# Patient Record
Sex: FEMALE | Race: WHITE | NOT HISPANIC OR LATINO | Employment: OTHER | URBAN - METROPOLITAN AREA
[De-identification: names, ages, dates, MRNs, and addresses within clinical notes are randomized per-mention and may not be internally consistent; named-entity substitution may affect disease eponyms.]

---

## 2017-04-12 ENCOUNTER — HOSPITAL ENCOUNTER (OUTPATIENT)
Dept: CARDIOLOGY | Facility: HOSPITAL | Age: 62
Discharge: HOME | End: 2017-04-12
Attending: HOSPITALIST | Admitting: HOSPITALIST

## 2017-05-04 ENCOUNTER — HOSPITAL ENCOUNTER (OUTPATIENT)
Dept: PREOP | Facility: HOSPITAL | Age: 62
Setting detail: HOSPITAL OUTPATIENT SURGERY
Discharge: HOME | End: 2017-05-04
Attending: INTERNAL MEDICINE | Admitting: INTERNAL MEDICINE

## 2017-05-04 ENCOUNTER — ON CAMPUS - OUTPATIENT (AMBULATORY)
Dept: URBAN - METROPOLITAN AREA HOSPITAL 85 | Facility: HOSPITAL | Age: 62
End: 2017-05-04

## 2017-05-04 DIAGNOSIS — K57.30 DIVERTICULOSIS OF LARGE INTESTINE WITHOUT PERFORATION OR ABS: ICD-10-CM

## 2017-05-04 DIAGNOSIS — Z12.11 ENCOUNTER FOR SCREENING FOR MALIGNANT NEOPLASM OF COLON: ICD-10-CM

## 2017-05-04 DIAGNOSIS — D12.2 BENIGN NEOPLASM OF ASCENDING COLON: ICD-10-CM

## 2017-05-04 DIAGNOSIS — K64.0 FIRST DEGREE HEMORRHOIDS: ICD-10-CM

## 2017-05-04 DIAGNOSIS — K21.9 GASTRO-ESOPHAGEAL REFLUX DISEASE WITHOUT ESOPHAGITIS: ICD-10-CM

## 2017-05-04 DIAGNOSIS — R13.10 DYSPHAGIA, UNSPECIFIED: ICD-10-CM

## 2017-05-04 PROCEDURE — 43450 DILATE ESOPHAGUS 1/MULT PASS: CPT | Performed by: INTERNAL MEDICINE

## 2017-05-04 PROCEDURE — 43235 EGD DIAGNOSTIC BRUSH WASH: CPT | Performed by: INTERNAL MEDICINE

## 2017-05-04 PROCEDURE — 45380 COLONOSCOPY AND BIOPSY: CPT | Mod: 59 | Performed by: INTERNAL MEDICINE

## 2017-05-04 PROCEDURE — 45385 COLONOSCOPY W/LESION REMOVAL: CPT | Performed by: INTERNAL MEDICINE

## 2017-07-27 ENCOUNTER — HOSPITAL ENCOUNTER (OUTPATIENT)
Dept: MAMMOGRAPHY | Facility: HOSPITAL | Age: 62
Discharge: HOME | End: 2017-07-27
Attending: OBSTETRICS & GYNECOLOGY | Admitting: OBSTETRICS & GYNECOLOGY

## 2017-11-29 ENCOUNTER — HOSPITAL ENCOUNTER (OUTPATIENT)
Dept: GENERAL RADIOLOGY | Facility: HOSPITAL | Age: 62
Discharge: HOME | End: 2017-11-29
Attending: SURGERY | Admitting: SURGERY

## 2017-12-13 ENCOUNTER — HOSPITAL ENCOUNTER (OUTPATIENT)
Dept: FAMILY MEDICINE CLINIC | Facility: CLINIC | Age: 62
Setting detail: SPECIMEN
Discharge: HOME | End: 2017-12-13
Attending: NURSE PRACTITIONER | Admitting: NURSE PRACTITIONER

## 2017-12-13 LAB
AMPICILLIN SUSC ISLT: NORMAL
AZTREONAM SUSC ISLT: NORMAL
BACTERIA ISLT: NORMAL
BACTERIA SPEC AEROBE CULT: NORMAL
CEFAZOLIN SUSC ISLT: NORMAL
CEFEPIME SUSC ISLT: NORMAL
CEFTRIAXONE SUSC ISLT: NORMAL
CIPROFLOXACIN SUSC ISLT: NORMAL
COLONY COUNT: NORMAL
ERTAPENEM SUSC ISLT: NORMAL
LEVOFLOXACIN SUSC ISLT: NORMAL
Lab: NORMAL
MEROPENEM SUSC ISLT: NORMAL
MICRO REPORT STATUS: NORMAL
NITROFURANTOIN SUSC ISLT: NORMAL
PIP+TAZO SUSC ISLT: NORMAL
SPECIMEN SOURCE: NORMAL
SUSC METH SPEC: NORMAL
TETRACYCLINE SUSC ISLT: NORMAL
TOBRAMYCIN SUSC ISLT: NORMAL
TRIMETHOPRIM/SULFA: NORMAL

## 2017-12-22 ENCOUNTER — HOSPITAL ENCOUNTER (OUTPATIENT)
Dept: FAMILY MEDICINE CLINIC | Facility: CLINIC | Age: 62
Setting detail: SPECIMEN
Discharge: HOME | End: 2017-12-22
Attending: HOSPITALIST | Admitting: HOSPITALIST

## 2017-12-22 LAB
ALBUMIN SERPL-MCNC: 3.9 G/DL (ref 3.5–4.8)
ALBUMIN/GLOB SERPL: 1.3 {RATIO} (ref 1–1.7)
ALP SERPL-CCNC: 85 IU/L (ref 32–91)
ALT SERPL-CCNC: 20 IU/L (ref 14–54)
ANION GAP SERPL CALC-SCNC: 11.4 MMOL/L (ref 10–20)
AST SERPL-CCNC: 27 IU/L (ref 15–41)
BASOPHILS # BLD AUTO: 0.1 10*3/UL (ref 0–0.2)
BASOPHILS NFR BLD AUTO: 1 % (ref 0–2)
BILIRUB SERPL-MCNC: 0.3 MG/DL (ref 0.3–1.2)
BUN SERPL-MCNC: 15 MG/DL (ref 8–20)
BUN/CREAT SERPL: 21.4 (ref 5.4–26.2)
CALCIUM SERPL-MCNC: 9.5 MG/DL (ref 8.9–10.3)
CHLORIDE SERPL-SCNC: 101 MMOL/L (ref 101–111)
CONV CO2: 29 MMOL/L (ref 22–32)
CONV TOTAL PROTEIN: 7 G/DL (ref 6.1–7.9)
CREAT UR-MCNC: 0.7 MG/DL (ref 0.4–1)
DIFFERENTIAL METHOD BLD: (no result)
EOSINOPHIL # BLD AUTO: 0.1 10*3/UL (ref 0–0.3)
EOSINOPHIL # BLD AUTO: 1 % (ref 0–3)
ERYTHROCYTE [DISTWIDTH] IN BLOOD BY AUTOMATED COUNT: 13.1 % (ref 11.5–14.5)
GLOBULIN UR ELPH-MCNC: 3.1 G/DL (ref 2.5–3.8)
GLUCOSE SERPL-MCNC: 99 MG/DL (ref 65–99)
HCT VFR BLD AUTO: 40.5 % (ref 35–49)
HGB BLD-MCNC: 13.8 G/DL (ref 12–15)
LYMPHOCYTES # BLD AUTO: 2.8 10*3/UL (ref 0.8–4.8)
LYMPHOCYTES NFR BLD AUTO: 41 % (ref 18–42)
MAGNESIUM SERPL-MCNC: 2.1 MG/DL (ref 1.8–2.5)
MCH RBC QN AUTO: 32.6 PG (ref 26–32)
MCHC RBC AUTO-ENTMCNC: 34 G/DL (ref 32–36)
MCV RBC AUTO: 95.8 FL (ref 80–94)
MONOCYTES # BLD AUTO: 0.5 10*3/UL (ref 0.1–1.3)
MONOCYTES NFR BLD AUTO: 8 % (ref 2–11)
NEUTROPHILS # BLD AUTO: 3.3 10*3/UL (ref 2.3–8.6)
NEUTROPHILS NFR BLD AUTO: 49 % (ref 50–75)
NRBC BLD AUTO-RTO: 0 /100{WBCS}
NRBC/RBC NFR BLD MANUAL: 0 10*3/UL
PLATELET # BLD AUTO: 299 10*3/UL (ref 150–450)
PMV BLD AUTO: 8.1 FL (ref 7.4–10.4)
POTASSIUM SERPL-SCNC: 3.4 MMOL/L (ref 3.6–5.1)
RBC # BLD AUTO: 4.22 10*6/UL (ref 4–5.4)
SODIUM SERPL-SCNC: 138 MMOL/L (ref 136–144)
WBC # BLD AUTO: 6.7 10*3/UL (ref 4.5–11.5)

## 2018-08-16 ENCOUNTER — HOSPITAL ENCOUNTER (OUTPATIENT)
Dept: MAMMOGRAPHY | Facility: HOSPITAL | Age: 63
Discharge: HOME | End: 2018-08-16
Attending: OBSTETRICS & GYNECOLOGY | Admitting: OBSTETRICS & GYNECOLOGY

## 2019-06-26 RX ORDER — PRIMIDONE 50 MG/1
TABLET ORAL
Qty: 180 TABLET | Refills: 0 | Status: SHIPPED | OUTPATIENT
Start: 2019-06-26 | End: 2019-09-23 | Stop reason: SDUPTHER

## 2019-06-30 PROCEDURE — 87086 URINE CULTURE/COLONY COUNT: CPT | Performed by: FAMILY MEDICINE

## 2019-07-29 ENCOUNTER — TRANSCRIBE ORDERS (OUTPATIENT)
Dept: ADMINISTRATIVE | Facility: HOSPITAL | Age: 64
End: 2019-07-29

## 2019-07-29 DIAGNOSIS — Z12.39 SCREENING FOR BREAST CANCER: Primary | ICD-10-CM

## 2019-08-26 ENCOUNTER — HOSPITAL ENCOUNTER (OUTPATIENT)
Dept: MAMMOGRAPHY | Facility: HOSPITAL | Age: 64
Discharge: HOME | End: 2019-08-26
Admitting: OBSTETRICS & GYNECOLOGY

## 2019-08-26 DIAGNOSIS — Z12.39 SCREENING FOR BREAST CANCER: ICD-10-CM

## 2019-08-26 PROCEDURE — 77067 SCR MAMMO BI INCL CAD: CPT

## 2019-08-26 PROCEDURE — 77063 BREAST TOMOSYNTHESIS BI: CPT

## 2019-09-08 RX ORDER — TRIAMTERENE AND HYDROCHLOROTHIAZIDE 37.5; 25 MG/1; MG/1
CAPSULE ORAL
Qty: 90 CAPSULE | Refills: 2 | Status: SHIPPED | OUTPATIENT
Start: 2019-09-08 | End: 2020-05-20 | Stop reason: SDUPTHER

## 2019-09-23 RX ORDER — PRIMIDONE 50 MG/1
TABLET ORAL
Qty: 180 TABLET | Refills: 0 | Status: SHIPPED | OUTPATIENT
Start: 2019-09-23 | End: 2020-03-26 | Stop reason: SDUPTHER

## 2020-03-26 ENCOUNTER — TELEPHONE (OUTPATIENT)
Dept: FAMILY MEDICINE CLINIC | Facility: CLINIC | Age: 65
End: 2020-03-26

## 2020-03-26 RX ORDER — PRIMIDONE 50 MG/1
50 TABLET ORAL 2 TIMES DAILY
Qty: 60 TABLET | Refills: 0 | Status: SHIPPED | OUTPATIENT
Start: 2020-03-26 | End: 2020-05-04

## 2020-03-26 NOTE — TELEPHONE ENCOUNTER
Will fill for 30 days  But she needs to reschedule appt with dr. Maynard   hasnt been seen since 2018

## 2020-03-26 NOTE — TELEPHONE ENCOUNTER
Patient called over for a refill of her PRIMODOME, and that she running very low and has tried to get refilled for a week now, even tho I dont see any other request

## 2020-05-04 RX ORDER — PRIMIDONE 50 MG/1
TABLET ORAL
Qty: 60 TABLET | Refills: 0 | Status: SHIPPED | OUTPATIENT
Start: 2020-05-04 | End: 2020-05-20 | Stop reason: SDUPTHER

## 2020-05-20 ENCOUNTER — OFFICE VISIT (OUTPATIENT)
Dept: FAMILY MEDICINE CLINIC | Facility: CLINIC | Age: 65
End: 2020-05-20

## 2020-05-20 VITALS
SYSTOLIC BLOOD PRESSURE: 118 MMHG | DIASTOLIC BLOOD PRESSURE: 72 MMHG | BODY MASS INDEX: 21.85 KG/M2 | WEIGHT: 128 LBS | RESPIRATION RATE: 16 BRPM | HEIGHT: 64 IN | OXYGEN SATURATION: 97 % | TEMPERATURE: 98.2 F | HEART RATE: 71 BPM

## 2020-05-20 DIAGNOSIS — F41.1 GENERALIZED ANXIETY DISORDER: ICD-10-CM

## 2020-05-20 DIAGNOSIS — I10 ESSENTIAL HYPERTENSION: Primary | ICD-10-CM

## 2020-05-20 DIAGNOSIS — R49.0 DYSPHONIA: ICD-10-CM

## 2020-05-20 PROBLEM — R52 BODY ACHES: Status: ACTIVE | Noted: 2019-01-04

## 2020-05-20 PROBLEM — E78.5 HYPERLIPIDEMIA: Status: ACTIVE | Noted: 2020-05-20

## 2020-05-20 PROBLEM — Z83.3 FAMILY HISTORY OF DIABETES MELLITUS: Status: ACTIVE | Noted: 2020-05-20

## 2020-05-20 PROBLEM — R07.9 CHEST PAIN: Status: ACTIVE | Noted: 2017-04-07

## 2020-05-20 PROBLEM — K52.9 GASTROENTERITIS: Status: ACTIVE | Noted: 2017-03-30

## 2020-05-20 PROCEDURE — 99213 OFFICE O/P EST LOW 20 MIN: CPT | Performed by: FAMILY MEDICINE

## 2020-05-20 RX ORDER — LORAZEPAM 1 MG/1
TABLET ORAL
COMMUNITY
Start: 2012-10-04 | End: 2020-05-20 | Stop reason: SDUPTHER

## 2020-05-20 RX ORDER — PRIMIDONE 50 MG/1
50 TABLET ORAL 2 TIMES DAILY
Qty: 180 TABLET | Refills: 3 | Status: SHIPPED | OUTPATIENT
Start: 2020-05-20 | End: 2021-06-04

## 2020-05-20 RX ORDER — TRIAMTERENE AND HYDROCHLOROTHIAZIDE 37.5; 25 MG/1; MG/1
1 CAPSULE ORAL DAILY
Qty: 90 CAPSULE | Refills: 3 | Status: SHIPPED | OUTPATIENT
Start: 2020-05-20 | End: 2020-09-01

## 2020-05-20 RX ORDER — LORAZEPAM 0.5 MG/1
0.5 TABLET ORAL 2 TIMES DAILY PRN
Qty: 50 TABLET | Refills: 0 | Status: SHIPPED | OUTPATIENT
Start: 2020-05-20

## 2020-05-20 NOTE — PROGRESS NOTES
Chief Complaint   Patient presents with   • Hypertension   • Hyperlipidemia     HPI  Mehnaz Raines is a 64 y.o. female that presents for f/u of multiple medical complaints    HTN: 118/72 today. Maintained on Dyazide 37.5/25mg daily. BP runs well at home. No issues.    Essential tremor of vocal cords: maintained on primidone 50mg BID for the last 1.5 years. This was initially diagnosed a few years ago, largely based on family history. Symptoms noticed w/ fluttering when singing. Not interested in botox injection. She notices minimal but noticeable difference w/ primidone.    Panic attacks: she requires 0.25mg Ativan weekly.     Review of Systems   Constitutional: Negative for chills, fever and unexpected weight loss.   HENT: Positive for voice change. Negative for congestion.    Eyes: Negative for visual disturbance.   Respiratory: Negative for cough and shortness of breath.    Cardiovascular: Negative for chest pain and palpitations.   Gastrointestinal: Negative for abdominal pain and vomiting.   Skin: Negative for rash and skin lesions.   Neurological: Positive for tremors and speech difficulty.   Psychiatric/Behavioral: Positive for stress. The patient is nervous/anxious.      The following portions of the patient's history were reviewed and updated as appropriate: problem list, past medical history, past surgical history, allergies, current medications    Problem List Tab  Patient History Tab  Immunizations Tab  Medications Tab  Chart Review Tab  Care Everywhere Tab  Synopsis Tab    PE  Vitals:    05/20/20 1316   BP: 118/72   Pulse: 71   Resp: 16   Temp: 98.2 °F (36.8 °C)   SpO2: 97%     Body mass index is 21.97 kg/m².  General: Well nourished, NAD  Head: AT/NC  Eyes: EOMI, anicteric sclera  Resp: CTAB, SCR, BS equal  CV: RRR w/o m/r/g; 2+ pulses  GI: Soft, NT/ND, +BS  MSK: FROM, no deformity, no edema  Skin: Warm, dry, intact  Neuro: Alert and oriented. No focal deficits.  Tremulous voice  Psych: Appropriate  mood and affect    Imaging  No Images in the past 120 days found..    Assessment/Plan   Mehnaz Raines is a 64 y.o. female that presents for   Chief Complaint   Patient presents with   • Hypertension   • Hyperlipidemia     Mehnaz was seen today for hypertension and hyperlipidemia.    Diagnoses and all orders for this visit:    Essential hypertension: 118/72 today  -     Continue triamterene-hydrochlorothiazide (DYAZIDE) 37.5-25 MG per capsule; Take 1 capsule by mouth Daily.    Dysphonia: Chronic.  Patient has family history of this.  She obtains mild improvement from her primidone  -     Continue primidone (MYSOLINE) 50 MG tablet; Take 1 tablet by mouth 2 (Two) Times a Day.    Generalized anxiety disorder: Using 0.5 mg/week for occasional anxiety exacerbation  -     Continue lORazepam (ATIVAN) 0.5 MG tablet; Take 1 tablet by mouth 2 (Two) Times a Day As Needed for Anxiety.    Follow-up in 4 months for Medicare wellness

## 2020-07-20 ENCOUNTER — TRANSCRIBE ORDERS (OUTPATIENT)
Dept: ADMINISTRATIVE | Facility: HOSPITAL | Age: 65
End: 2020-07-20

## 2020-07-20 DIAGNOSIS — Z12.31 SCREENING MAMMOGRAM, ENCOUNTER FOR: Primary | ICD-10-CM

## 2020-08-27 ENCOUNTER — HOSPITAL ENCOUNTER (OUTPATIENT)
Dept: MAMMOGRAPHY | Facility: HOSPITAL | Age: 65
Discharge: HOME | End: 2020-08-27
Admitting: OBSTETRICS & GYNECOLOGY

## 2020-08-27 DIAGNOSIS — Z12.31 SCREENING MAMMOGRAM, ENCOUNTER FOR: ICD-10-CM

## 2020-08-27 PROCEDURE — 77067 SCR MAMMO BI INCL CAD: CPT

## 2020-08-27 PROCEDURE — 77063 BREAST TOMOSYNTHESIS BI: CPT

## 2020-09-01 ENCOUNTER — LAB (OUTPATIENT)
Dept: FAMILY MEDICINE CLINIC | Facility: CLINIC | Age: 65
End: 2020-09-01

## 2020-09-01 ENCOUNTER — OFFICE VISIT (OUTPATIENT)
Dept: FAMILY MEDICINE CLINIC | Facility: CLINIC | Age: 65
End: 2020-09-01

## 2020-09-01 VITALS
DIASTOLIC BLOOD PRESSURE: 62 MMHG | HEIGHT: 64 IN | BODY MASS INDEX: 20.83 KG/M2 | RESPIRATION RATE: 16 BRPM | OXYGEN SATURATION: 98 % | WEIGHT: 122 LBS | SYSTOLIC BLOOD PRESSURE: 100 MMHG | TEMPERATURE: 97.7 F | HEART RATE: 65 BPM

## 2020-09-01 DIAGNOSIS — M54.12 CERVICAL RADICULOPATHY: ICD-10-CM

## 2020-09-01 DIAGNOSIS — R93.7 ABNORMAL FINDINGS ON DIAGNOSTIC IMAGING OF OTHER PARTS OF MUSCULOSKELETAL SYSTEM: ICD-10-CM

## 2020-09-01 DIAGNOSIS — Z23 ENCOUNTER FOR IMMUNIZATION: ICD-10-CM

## 2020-09-01 DIAGNOSIS — M19.041 OSTEOARTHRITIS OF RIGHT HAND, UNSPECIFIED OSTEOARTHRITIS TYPE: ICD-10-CM

## 2020-09-01 DIAGNOSIS — I10 ESSENTIAL HYPERTENSION: ICD-10-CM

## 2020-09-01 DIAGNOSIS — Z00.00 PREVENTATIVE HEALTH CARE: Primary | ICD-10-CM

## 2020-09-01 DIAGNOSIS — F41.1 GENERALIZED ANXIETY DISORDER: ICD-10-CM

## 2020-09-01 DIAGNOSIS — E55.9 VITAMIN D DEFICIENCY, UNSPECIFIED: ICD-10-CM

## 2020-09-01 PROBLEM — M19.90 OSTEOARTHRITIS: Status: ACTIVE | Noted: 2020-09-01

## 2020-09-01 PROBLEM — H53.62 ACQUIRED NIGHT BLINDNESS: Status: ACTIVE | Noted: 2019-11-12

## 2020-09-01 LAB
25(OH)D3 SERPL-MCNC: 54 NG/ML (ref 30–100)
ALBUMIN SERPL-MCNC: 4.2 G/DL (ref 3.5–5.2)
ALBUMIN/GLOB SERPL: 1.4 G/DL
ALP SERPL-CCNC: 80 U/L (ref 39–117)
ALT SERPL W P-5'-P-CCNC: 16 U/L (ref 1–33)
ANION GAP SERPL CALCULATED.3IONS-SCNC: 11.2 MMOL/L (ref 5–15)
AST SERPL-CCNC: 25 U/L (ref 1–32)
BASOPHILS # BLD AUTO: 0.07 10*3/MM3 (ref 0–0.2)
BASOPHILS NFR BLD AUTO: 1.3 % (ref 0–1.5)
BILIRUB SERPL-MCNC: 0.4 MG/DL (ref 0–1.2)
BUN SERPL-MCNC: 13 MG/DL (ref 8–23)
BUN/CREAT SERPL: 21.3 (ref 7–25)
CALCIUM SPEC-SCNC: 9.6 MG/DL (ref 8.6–10.5)
CHLORIDE SERPL-SCNC: 100 MMOL/L (ref 98–107)
CHOLEST SERPL-MCNC: 236 MG/DL (ref 0–200)
CO2 SERPL-SCNC: 27.8 MMOL/L (ref 22–29)
CREAT SERPL-MCNC: 0.61 MG/DL (ref 0.57–1)
DEPRECATED RDW RBC AUTO: 43.3 FL (ref 37–54)
EOSINOPHIL # BLD AUTO: 0.11 10*3/MM3 (ref 0–0.4)
EOSINOPHIL NFR BLD AUTO: 2 % (ref 0.3–6.2)
ERYTHROCYTE [DISTWIDTH] IN BLOOD BY AUTOMATED COUNT: 12.7 % (ref 12.3–15.4)
GFR SERPL CREATININE-BSD FRML MDRD: 98 ML/MIN/1.73
GLOBULIN UR ELPH-MCNC: 3 GM/DL
GLUCOSE SERPL-MCNC: 80 MG/DL (ref 65–99)
HBA1C MFR BLD: 5.6 % (ref 3.5–5.6)
HCT VFR BLD AUTO: 38.6 % (ref 34–46.6)
HDLC SERPL-MCNC: 67 MG/DL (ref 40–60)
HGB BLD-MCNC: 13.4 G/DL (ref 12–15.9)
IMM GRANULOCYTES # BLD AUTO: 0.01 10*3/MM3 (ref 0–0.05)
IMM GRANULOCYTES NFR BLD AUTO: 0.2 % (ref 0–0.5)
LDLC SERPL CALC-MCNC: 148 MG/DL (ref 0–100)
LDLC/HDLC SERPL: 2.21 {RATIO}
LYMPHOCYTES # BLD AUTO: 2.06 10*3/MM3 (ref 0.7–3.1)
LYMPHOCYTES NFR BLD AUTO: 38 % (ref 19.6–45.3)
MCH RBC QN AUTO: 32.8 PG (ref 26.6–33)
MCHC RBC AUTO-ENTMCNC: 34.7 G/DL (ref 31.5–35.7)
MCV RBC AUTO: 94.4 FL (ref 79–97)
MONOCYTES # BLD AUTO: 0.59 10*3/MM3 (ref 0.1–0.9)
MONOCYTES NFR BLD AUTO: 10.9 % (ref 5–12)
NEUTROPHILS NFR BLD AUTO: 2.58 10*3/MM3 (ref 1.7–7)
NEUTROPHILS NFR BLD AUTO: 47.6 % (ref 42.7–76)
NRBC BLD AUTO-RTO: 0.2 /100 WBC (ref 0–0.2)
PLATELET # BLD AUTO: 270 10*3/MM3 (ref 140–450)
PMV BLD AUTO: 10.1 FL (ref 6–12)
POTASSIUM SERPL-SCNC: 4 MMOL/L (ref 3.5–5.2)
PROT SERPL-MCNC: 7.2 G/DL (ref 6–8.5)
RBC # BLD AUTO: 4.09 10*6/MM3 (ref 3.77–5.28)
SODIUM SERPL-SCNC: 139 MMOL/L (ref 136–145)
T4 FREE SERPL-MCNC: 1.23 NG/DL (ref 0.93–1.7)
TRIGL SERPL-MCNC: 103 MG/DL (ref 0–150)
TSH SERPL DL<=0.05 MIU/L-ACNC: 1.7 UIU/ML (ref 0.27–4.2)
VIT B12 BLD-MCNC: 350 PG/ML (ref 211–946)
VLDLC SERPL-MCNC: 20.6 MG/DL (ref 5–40)
WBC # BLD AUTO: 5.42 10*3/MM3 (ref 3.4–10.8)

## 2020-09-01 PROCEDURE — 80061 LIPID PANEL: CPT | Performed by: FAMILY MEDICINE

## 2020-09-01 PROCEDURE — 90732 PPSV23 VACC 2 YRS+ SUBQ/IM: CPT | Performed by: FAMILY MEDICINE

## 2020-09-01 PROCEDURE — 83036 HEMOGLOBIN GLYCOSYLATED A1C: CPT | Performed by: FAMILY MEDICINE

## 2020-09-01 PROCEDURE — 36415 COLL VENOUS BLD VENIPUNCTURE: CPT | Performed by: FAMILY MEDICINE

## 2020-09-01 PROCEDURE — 84439 ASSAY OF FREE THYROXINE: CPT | Performed by: FAMILY MEDICINE

## 2020-09-01 PROCEDURE — 82607 VITAMIN B-12: CPT | Performed by: FAMILY MEDICINE

## 2020-09-01 PROCEDURE — 80053 COMPREHEN METABOLIC PANEL: CPT | Performed by: FAMILY MEDICINE

## 2020-09-01 PROCEDURE — 85025 COMPLETE CBC W/AUTO DIFF WBC: CPT | Performed by: FAMILY MEDICINE

## 2020-09-01 PROCEDURE — 82306 VITAMIN D 25 HYDROXY: CPT | Performed by: FAMILY MEDICINE

## 2020-09-01 PROCEDURE — G0402 INITIAL PREVENTIVE EXAM: HCPCS | Performed by: FAMILY MEDICINE

## 2020-09-01 PROCEDURE — 84443 ASSAY THYROID STIM HORMONE: CPT | Performed by: FAMILY MEDICINE

## 2020-09-01 PROCEDURE — 99213 OFFICE O/P EST LOW 20 MIN: CPT | Performed by: FAMILY MEDICINE

## 2020-09-01 PROCEDURE — G0009 ADMIN PNEUMOCOCCAL VACCINE: HCPCS | Performed by: FAMILY MEDICINE

## 2020-09-01 RX ORDER — ESCITALOPRAM OXALATE 5 MG/1
5 TABLET ORAL DAILY
Qty: 90 TABLET | Refills: 3 | Status: SHIPPED | OUTPATIENT
Start: 2020-09-01 | End: 2021-09-01

## 2020-09-01 RX ORDER — MELATONIN
1000 DAILY
COMMUNITY
End: 2022-09-23 | Stop reason: ALTCHOICE

## 2020-09-01 NOTE — PROGRESS NOTES
Chief Complaint   Patient presents with   • Annual Exam     HPI  Mehnaz Raines is a 64 y.o. female that presents for f/u of multiple medical problems    OA: patient reports pain in R hand the day following yard work. She takes Tylenol or Motrin w/ good results. 3rd digit affected most. Intermittently w/ catching/trigger finger    Neck pain: patient reports neck pain and numbness into R arm intermittently. At times she is unable to move the arm. She recently had an episode in which she couldn't move the R arm during the day and hadn't been lying on it awkwardly. Not taking medication for this    HTN: 100/62 today. Taking BP at home- generally 112. No LH/dizziness at this time    Anxiety/depression: caring for ill  w/ neurodegenerative disease. Has previously tried SSRIs but didn't like them and discontinued. Not seeing counselor or therapist at this time    Review of Systems  The following portions of the patient's history were reviewed and updated as appropriate: problem list, past medical history, past surgical history, allergies, current medications, past social history and past family history.    Problem List Tab  Patient History Tab  Immunizations Tab  Medications Tab  Chart Review Tab  Care Everywhere Tab  Synopsis Tab    PE  Vitals:    09/01/20 1044   BP: 100/62   Pulse: 65   Resp: 16   Temp: 97.7 °F (36.5 °C)   SpO2: 98%     Body mass index is 20.94 kg/m².  General: Well nourished, NAD  Head: AT/NC  Eyes: EOMI, anicteric sclera  ENT: MMM w/o erythema  Neck: Supple, no thyromegaly or LAD  Resp: CTAB, SCR, BS equal  CV: RRR w/o m/r/g; 2+ pulses  GI: Soft, NT/ND, +BS  MSK: FROM, no deformity, no edema  Skin: Warm, dry, intact  Neuro: Alert and oriented. No focal deficits  Psych: Appropriate mood and affect    Imaging  Mammo Screening Digital Tomosynthesis Bilateral With Cad    Result Date: 8/27/2020  Screening mammogram in 1 year.  ASSESSMENT: BIRADS: 2  BI-RADS category Key: Category 0-incomplete-needs  additional imaging and/or prior images for comparison. Category 1-negative. Category 2-benign findings. Category 3-probably benign-short interval follow-up suggested. Category 4-suspicious abnormality-biopsy should be considered. Category 5-highly suggestive for malignancy-appropriate action should be taken. Category 6-known biopsy-proven malignancy-appropriate action should be taken.  NOTE: There is at least a 15% false-negative rate and mammographic detection of cancer. Therefore, management of a palpable abnormality must be based on clinical grounds and a negative mammography report should not defer further breast evaluation when clinically indicated.  The patient's information is been entered into a reminder system (MRS) with a targeted due date for the next mammogram.  Patient's with mammographically dense breast will be notified by mail, according to [place] law. We believe these women should undergo a risk assessment, taking into consideration breast density and other factors, including but not limited to, family history of breast cancer and other malignancies and personal history of breast cancer are high risk lesions. Women who are found to have lifetime risk of greater than 20-25% may benefit  from additional screening, such as with breast MRI.  Electronically Signed By-Keny Costello On:8/27/2020 9:48 AM This report was finalized on [id number] by  Keny Costello, .      Assessment/Plan   Mehnaz Raines is a 64 y.o. female that presents for   Chief Complaint   Patient presents with   • Annual Exam     There are no diagnoses linked to this encounter.      - Trial off Dyazide 37.5/25    **Home health/ Respite    Preventative:   Colonoscopy: due 2021  Mammogram: 8/2020  Pap smear: recent abnormal but normal colposcopy- follows aron/ [facility]  DEXA: Ordered today  Shingles: Completed  Pneumonia: Pneumovax ordered today  Tdap: 5/2020  Influenza: 2019, recommended

## 2020-09-02 ENCOUNTER — TREATMENT (OUTPATIENT)
Dept: PHYSICAL THERAPY | Facility: CLINIC | Age: 65
End: 2020-09-02

## 2020-09-02 DIAGNOSIS — M54.12 RADICULOPATHY, CERVICAL: Primary | ICD-10-CM

## 2020-09-02 PROCEDURE — 97161 PT EVAL LOW COMPLEX 20 MIN: CPT | Performed by: PHYSICAL THERAPIST

## 2020-09-02 PROCEDURE — 97140 MANUAL THERAPY 1/> REGIONS: CPT | Performed by: PHYSICAL THERAPIST

## 2020-09-02 NOTE — PROGRESS NOTES
Physical Therapy Initial Evaluation and Plan of Care    Patient: Mehnaz Raines   : 1955  Diagnosis/ICD-10 Code:  Radiculopathy, cervical [M54.12]  Referring practitioner: Abhishek Maynard MD  Date of Initial Visit: 2020  Today's Date: 2020  Patient seen for 1 sessions           Subjective Questionnaire: NDI:      Subjective Evaluation    History of Present Illness  Mechanism of injury: Pt is a 63 yo female with c/o increased neck pain and radiating RUE pain.  Recently has been waking up in the night and her arms are asleep.  R worse than L.  Typically prefers R sidelying to sleep.  Does have an inversion table she uses at 30 deg for about 10 min as needed for her neck/back.  Cares for her  that is dealing with a neurogenic disease.  Noted he has only had one fall in the past year.  Is planning to start doing silver sneakers but hasn't yet.  Biggest problems at home - lifting 44lb bag of salt to put in water softener and lifting large water jug for [employer].    Pain 2-3/10 with resting.  Pain up to 6/10 after filling out paperwork or reading or using ipad.  No numbness/tingling currently.  Typically night time numbness occurs 3-4 times/week.  Taking ibuprofen as needed.  Did have one incident 1-2 weeks ago where her R arm was numb and she couldn't lift her shoulder.  She could still move her hand though.  Scared her and that is what sent her here.  Does have and order for xrays and a bone density scan but hasn't had them yet.  Pt is R handed.  Pt is a retired nurse.  Has had occasional pain into R side jaw also but occurred when she was lying down on the floor lifting some weights.  Now walks 2 miles daily but not doing other exercises due to inc pain/numbness that has occurred.       Quality of life: good    Pain  Current pain ratin  Quality: dull ache  Relieving factors: medications, change in position and rest  Aggravating factors: prolonged positioning and  sleeping  Progression: no change    Social Support  Lives with: spouse    Treatments  Previous treatment: physical therapy  Current treatment: physical therapy  Patient Goals  Patient goals for therapy: decreased pain, increased motion, independence with ADLs/IADLs and return to sport/leisure activities             Objective          Active Range of Motion     Additional Active Range of Motion Details  Cervical AROM seated  Flex WFL  Ext 75%  BSB 50% with inc pain on R  B Rot 50% with inc pain on R    BUE AROM WFL with no inc pain    Strength/Myotome Testing     Left Shoulder     Planes of Motion   Flexion: 4+   Abduction: 4+   External rotation at 0°: 4+   Internal rotation at 0°: 4+     Right Shoulder     Planes of Motion   Flexion: 4+   Abduction: 4+   External rotation at 0°: 4+   Internal rotation at 0°: 4+     Left Elbow   Flexion: 4+  Extension: 4+    Right Elbow   Flexion: 4+  Extension: 4+    Left Wrist/Hand   Wrist extension: 4+  Wrist flexion: 4+          Assessment & Plan     Assessment  Impairments: abnormal or restricted ROM and pain with function  Assessment details: Pt has increased neck pain and radiating RUE numbness.  Decreased cervical AROM.  Difficulty lifting heavy salt bags and water jugs.  Increased pain/numbness at night/difficulty sleeping.    Prognosis: good    Goals  Plan Goals: STG  Pt I with HEP in 2 weeks.  To dec pain in neck 1-2/10 max in 2-3 weeks.  To report no radicular symptoms in 2-3 weeks.  LTG  To tolerate lifting 40+ lbs with no inc pain/tingling in 4-6 weeks.  To tolerate sleep through the night with no inc pain/tingling in 4-6 weeks.  To dec neck pain 0-1/10 max in 4-6 weeks.      Plan  Therapy options: will be seen for skilled physical therapy services  Planned modality interventions: cryotherapy, electrical stimulation/Russian stimulation, thermotherapy (hydrocollator packs), ultrasound and traction  Other planned modality interventions: dry needling  Planned therapy  interventions: flexibility, functional ROM exercises, home exercise program, manual therapy, postural training, spinal/joint mobilization, strengthening, stretching and therapeutic activities  Frequency: 2x week  Duration in visits: 20  Treatment plan discussed with: patient  Plan details: Began with manual tx with focus on UT STM and cervical distraction.  Plan to add modalities for pain/tightness next visit.   May add cervical traction as needed.          Timed:         Manual Therapy:    15     mins  90831;     Therapeutic Exercise:         mins  11580;     Neuromuscular hSaye:        mins  22724;    Therapeutic Activity:          mins  22428;     Gait Training:           mins  42615;     Ultrasound:          mins  48351;    Ionto                                   mins   88085    Un-Timed:  Electrical Stimulation:         mins  37608 (MC );  Dry Needling          mins self-pay  Traction          mins 97138  Low Eval     45     Mins  43451  Mod Eval          Mins  78534  High Eval                            Mins  07598        Timed Treatment:   15   mins   Total Treatment:     60   mins    PT SIGNATURE: Nhung Mccabe, PT   DATE TREATMENT INITIATED: 9/2/2020    Medicare Initial Certification  Certification Period: 12/1/2020  I certify that the therapy services are furnished while this patient is under my care.  The services outlined above are required by this patient, and will be reviewed every 90 days.     PHYSICIAN: Abhishek Maynard MD      DATE:     Please sign and return via fax to [phone].. Thank you, [facility].

## 2020-09-09 ENCOUNTER — TREATMENT (OUTPATIENT)
Dept: PHYSICAL THERAPY | Facility: CLINIC | Age: 65
End: 2020-09-09

## 2020-09-09 ENCOUNTER — TELEPHONE (OUTPATIENT)
Dept: FAMILY MEDICINE CLINIC | Facility: CLINIC | Age: 65
End: 2020-09-09

## 2020-09-09 DIAGNOSIS — M54.12 RADICULOPATHY, CERVICAL: Primary | ICD-10-CM

## 2020-09-09 PROCEDURE — G0283 ELEC STIM OTHER THAN WOUND: HCPCS | Performed by: PHYSICAL THERAPIST

## 2020-09-09 PROCEDURE — 97140 MANUAL THERAPY 1/> REGIONS: CPT | Performed by: PHYSICAL THERAPIST

## 2020-09-09 PROCEDURE — 97110 THERAPEUTIC EXERCISES: CPT | Performed by: PHYSICAL THERAPIST

## 2020-09-09 RX ORDER — HYDROCHLOROTHIAZIDE 25 MG/1
25 TABLET ORAL DAILY
Qty: 90 TABLET | Refills: 3 | Status: SHIPPED | OUTPATIENT
Start: 2020-09-09 | End: 2021-03-03

## 2020-09-09 NOTE — TELEPHONE ENCOUNTER
I sent a new BP medicine to her pharmacy. She should take this one and not the medicine we stopped at her visit. Let me know where BP is running next week

## 2020-09-09 NOTE — PROGRESS NOTES
Physical Therapy Daily Progress Note      Patient: Mehnaz Raines   : 1955  Diagnosis/ICD-10 Code:  Radiculopathy, cervical [M54.12]  Referring practitioner: Mesfin Maynard MD  Date of Initial Visit: Type: THERAPY  Noted: 2020  Today's Date: 2020  Patient seen for 2 sessions         Mehnaz Raines reports: she is doing better overall today and states she has a pinch feeling in the area between her R collar bone and R UT rubbing the area as she expresses discomfort.    Objective   See Exercise, Manual, and Modality Logs for complete treatment.     Assessment/Plan   Pt. Was educated on scapular mechanics and introduced to activities conducive to relieving overactivation of UT. Pt. Tolerates added exercises well and has mixed feelings toward addition of Estim this date. Pt. States L electrodes felt good but R electrodes seemed uncomfortable at the 10 minute mesfin the R electrodes were turned off and L remained on for remaining 5 minutes. Pt. Was given HEP for mid and low rows, scap squeezes, and B ER.     Progress per Plan of Care           Timed:         Manual Therapy:    15     mins  78983;     Therapeutic Exercise:    15     mins  60516;     Neuromuscular Shaye:        mins  82229;    Therapeutic Activity:          mins  05807;     Gait Training:           mins  36607;     Ultrasound:          mins  52849;    Ionto                                   mins   48888  Self Care                            mins   49048  Canalith Repos                   mins  4209    Un-Timed:  Electrical Stimulation:    15     mins  46008 ( );  Dry Needling          mins self-pay  Traction          mins 75981  Low Eval          Mins  29173  Mod Eval          Mins  33275  High Eval                            Mins  42496    Timed Treatment:   30   mins   Total Treatment:     45   mins    Kinza Muniz PTA  Physical Therapist Assistant License #[id number]

## 2020-09-10 ENCOUNTER — HOSPITAL ENCOUNTER (OUTPATIENT)
Dept: BONE DENSITY | Facility: HOSPITAL | Age: 65
Discharge: HOME | End: 2020-09-10

## 2020-09-10 ENCOUNTER — HOSPITAL ENCOUNTER (OUTPATIENT)
Dept: GENERAL RADIOLOGY | Facility: HOSPITAL | Age: 65
Discharge: HOME | End: 2020-09-10

## 2020-09-10 DIAGNOSIS — M54.12 CERVICAL RADICULOPATHY: ICD-10-CM

## 2020-09-10 DIAGNOSIS — M19.041 OSTEOARTHRITIS OF RIGHT HAND, UNSPECIFIED OSTEOARTHRITIS TYPE: ICD-10-CM

## 2020-09-10 PROCEDURE — 77080 DXA BONE DENSITY AXIAL: CPT

## 2020-09-10 PROCEDURE — 73130 X-RAY EXAM OF HAND: CPT

## 2020-09-10 PROCEDURE — 72050 X-RAY EXAM NECK SPINE 4/5VWS: CPT

## 2020-09-14 ENCOUNTER — TREATMENT (OUTPATIENT)
Dept: PHYSICAL THERAPY | Facility: CLINIC | Age: 65
End: 2020-09-14

## 2020-09-14 DIAGNOSIS — M54.12 RADICULOPATHY, CERVICAL: Primary | ICD-10-CM

## 2020-09-14 PROCEDURE — 97140 MANUAL THERAPY 1/> REGIONS: CPT | Performed by: PHYSICAL THERAPIST

## 2020-09-14 PROCEDURE — G0283 ELEC STIM OTHER THAN WOUND: HCPCS | Performed by: PHYSICAL THERAPIST

## 2020-09-14 PROCEDURE — 97110 THERAPEUTIC EXERCISES: CPT | Performed by: PHYSICAL THERAPIST

## 2020-09-14 NOTE — PROGRESS NOTES
Physical Therapy Daily Progress Note      Patient: Mehnaz Raines   : 1955  Diagnosis/ICD-10 Code:  Radiculopathy, cervical [M54.12]   Problem List Items Addressed This Visit     None      Visit Diagnoses     Radiculopathy, cervical    -  Primary         Referring practitioner: Abhishek Maynard MD  Date of Initial Visit: Type: THERAPY  Noted: 2020  Today's Date: 2020    VISIT#: 3      Subjective Pt stated that she is doing alright.  Had inc soreness Thurs/Fri last week but probably from having cervical xrays - had to move head into end range ext for one view.  Surprised that her hand xray didn't show anything major.  Noted she has to change her water today.      Objective Inc time on nustep and weight on row ex.      See Exercise, Manual, and Modality Logs for complete treatment.     Assessment/Plan Pt tolerated tx well.  Occasional cueing for neck posture during exercise.      Progress per Plan of Care         Timed:         Manual Therapy:    15     mins  07636;     Therapeutic Exercise:    20     mins  38250;     Neuromuscular Shaye:        mins  55153;    Therapeutic Activity:          mins  73145;     Gait Training:           mins  95727;     Ultrasound:          mins  70287;    Ionto                                   mins   55739  Self Care                            mins   32777  Canalith Repos                   mins  57915    Un-Timed:  Electrical Stimulation:    15     mins  45890 ( );  Dry Needling          mins self-pay  Traction          mins 19343  Low Eval          Mins  73576  Mod Eval          Mins  27894  High Eval                            Mins  65209  Re-Eval                               mins  34005    Timed Treatment:   35   mins   Total Treatment:     50   mins    Nhung Mccabe, PT  Physical Therapist

## 2020-09-18 ENCOUNTER — TREATMENT (OUTPATIENT)
Dept: PHYSICAL THERAPY | Facility: CLINIC | Age: 65
End: 2020-09-18

## 2020-09-18 DIAGNOSIS — M54.12 RADICULOPATHY, CERVICAL: Primary | ICD-10-CM

## 2020-09-18 PROCEDURE — G0283 ELEC STIM OTHER THAN WOUND: HCPCS | Performed by: PHYSICAL THERAPIST

## 2020-09-18 PROCEDURE — 97110 THERAPEUTIC EXERCISES: CPT | Performed by: PHYSICAL THERAPIST

## 2020-09-18 PROCEDURE — 97140 MANUAL THERAPY 1/> REGIONS: CPT | Performed by: PHYSICAL THERAPIST

## 2020-09-18 NOTE — PROGRESS NOTES
Physical Therapy Daily Progress Note      Patient: Mehnaz Raines   : 1955  Diagnosis/ICD-10 Code:  Radiculopathy, cervical [M54.12]  Referring practitioner: Abhishek Maynard MD  Date of Initial Visit: Type: THERAPY  Noted: 2020  Today's Date: 2020  Patient seen for 4 sessions         Mehnaz Raines reports: she has no complaints of pian this morning and states the pinching she has reported in the past has been improved and is only intermittent now rather than constant.     Objective   See Exercise, Manual, and Modality Logs for complete treatment.     Assessment/Plan   Pt. Tolerates manual intervention well this date and performs all exercises without pain. Pt. Did request that manual follow exercise next time due exercise making her muscles achy this date.    Progress per Plan of Care           Timed:         Manual Therapy:    15     mins  16339;     Therapeutic Exercise:    15     mins  75753;     Neuromuscular Shaye:        mins  23195;    Therapeutic Activity:          mins  41281;     Gait Training:           mins  09903;     Ultrasound:          mins  23474;    Ionto                                   mins   25386  Self Care                            mins   47577  Canalith Repos                   mins  4209    Un-Timed:  Electrical Stimulation:    15     mins  27201 ( );  Dry Needling          mins self-pay  Traction          mins 51940  Low Eval          Mins  14896  Mod Eval          Mins  42945  High Eval                            Mins  35604    Timed Treatment:   30   mins   Total Treatment:     45   mins    Kinza Muniz PTA  Physical Therapist Assistant License #[id number]

## 2020-09-21 ENCOUNTER — TREATMENT (OUTPATIENT)
Dept: PHYSICAL THERAPY | Facility: CLINIC | Age: 65
End: 2020-09-21

## 2020-09-21 DIAGNOSIS — M54.12 RADICULOPATHY, CERVICAL: Primary | ICD-10-CM

## 2020-09-21 PROCEDURE — 97140 MANUAL THERAPY 1/> REGIONS: CPT | Performed by: PHYSICAL THERAPIST

## 2020-09-21 PROCEDURE — 97110 THERAPEUTIC EXERCISES: CPT | Performed by: PHYSICAL THERAPIST

## 2020-09-21 PROCEDURE — G0283 ELEC STIM OTHER THAN WOUND: HCPCS | Performed by: PHYSICAL THERAPIST

## 2020-09-21 NOTE — PROGRESS NOTES
Physical Therapy Daily Progress Note      Patient: Mehnaz Raines   : 1955  Diagnosis/ICD-10 Code:  Radiculopathy, cervical [M54.12]   Problem List Items Addressed This Visit     None      Visit Diagnoses     Radiculopathy, cervical    -  Primary         Referring practitioner: Abhishek Maynard MD  Date of Initial Visit: Type: THERAPY  Noted: 2020  Today's Date: 2020    VISIT#: 5    Subjective Really likes getting her neck stretched.  Did well with exercise last visit but seems to tighten muscles so would prefer to do stretching and estim last to lessen tightness.      Objective Began with exercise.  Completed manual stretching.  Finished with estim.      See Exercise, Manual, and Modality Logs for complete treatment.     Assessment/Plan Good tolerance to all tx.      Progress per Plan of Care         Timed:         Manual Therapy:    15     mins  72386;     Therapeutic Exercise:    15     mins  53485;     Neuromuscular Shaye:        mins  27403;    Therapeutic Activity:          mins  19486;     Gait Training:           mins  87696;     Ultrasound:          mins  94371;    Ionto                                   mins   30568  Self Care                            mins   65389  Canalith Repos                   mins  20780    Un-Timed:  Electrical Stimulation:    15     mins  09267 ( );  Dry Needling          mins self-pay  Traction          mins 34040  Low Eval          Mins  84015  Mod Eval          Mins  39095  High Eval                            Mins  85517  Re-Eval                               mins  48667    Timed Treatment:   30   mins   Total Treatment:     45   mins    Nhung Mccabe PT  Physical Therapist

## 2020-09-25 ENCOUNTER — TREATMENT (OUTPATIENT)
Dept: PHYSICAL THERAPY | Facility: CLINIC | Age: 65
End: 2020-09-25

## 2020-09-25 DIAGNOSIS — M54.12 RADICULOPATHY, CERVICAL: Primary | ICD-10-CM

## 2020-09-25 PROCEDURE — 97140 MANUAL THERAPY 1/> REGIONS: CPT | Performed by: PHYSICAL THERAPIST

## 2020-09-25 PROCEDURE — G0283 ELEC STIM OTHER THAN WOUND: HCPCS | Performed by: PHYSICAL THERAPIST

## 2020-09-25 PROCEDURE — 97110 THERAPEUTIC EXERCISES: CPT | Performed by: PHYSICAL THERAPIST

## 2020-09-25 NOTE — PROGRESS NOTES
Physical Therapy Daily Progress Note      Patient: Mehnaz Raines   : 1955  Diagnosis/ICD-10 Code:  Radiculopathy, cervical [M54.12]  Referring practitioner: Abhishek Maynard MD  Date of Initial Visit: Type: THERAPY  Noted: 2020  Today's Date: 2020  Patient seen for 6 sessions         Mehnaz Raines reports: she is doing much better today and continues to improve by doing therapy stretches and exercise along with being motivated to do her silver sneakers program which has helped her become more conditioned. Pt. States she still has numbness at night most night sin the R arm but even that is improving.    Objective   See Exercise, Manual, and Modality Logs for complete treatment.     Assessment/Plan   Pt. Tolerates treatment well this date and shows improving scapular mechanics with stretches and exercise. Pt. Overall posture is improving and tissue to palpation is much less tense.    Progress per Plan of Care           Timed:         Manual Therapy:    15     mins  75295;     Therapeutic Exercise:    15     mins  87025;     Neuromuscular Shaye:        mins  49290;    Therapeutic Activity:          mins  69396;     Gait Training:           mins  00760;     Ultrasound:          mins  87253;    Ionto                                   mins   33897  Self Care                            mins   74985  Canalith Repos                   mins  4209    Un-Timed:  Electrical Stimulation:    15     mins  66692 ( );  Dry Needling          mins self-pay  Traction          mins 47835  Low Eval          Mins  57194  Mod Eval          Mins  64170  High Eval                            Mins  40257    Timed Treatment:   30   mins   Total Treatment:     45   mins    Kinza Muniz PTA  Physical Therapist Assistant License #[id number]

## 2020-10-02 ENCOUNTER — TREATMENT (OUTPATIENT)
Dept: PHYSICAL THERAPY | Facility: CLINIC | Age: 65
End: 2020-10-02

## 2020-10-02 DIAGNOSIS — M54.12 RADICULOPATHY, CERVICAL: Primary | ICD-10-CM

## 2020-10-02 PROCEDURE — 97140 MANUAL THERAPY 1/> REGIONS: CPT | Performed by: PHYSICAL THERAPIST

## 2020-10-02 PROCEDURE — 97110 THERAPEUTIC EXERCISES: CPT | Performed by: PHYSICAL THERAPIST

## 2020-10-02 PROCEDURE — G0283 ELEC STIM OTHER THAN WOUND: HCPCS | Performed by: PHYSICAL THERAPIST

## 2020-10-02 NOTE — PROGRESS NOTES
Physical Therapy Daily Progress Note/ Discharge      Patient: Mehnaz Raines   : 1955  Diagnosis/ICD-10 Code:  Radiculopathy, cervical [M54.12]   Problem List Items Addressed This Visit     None      Visit Diagnoses     Radiculopathy, cervical    -  Primary         Referring practitioner: Abhishek Maynard MD  Date of Initial Visit: Type: THERAPY  Noted: 2020  Today's Date: 10/2/2020    VISIT#: 7    Subjective Pt stated that she is very pleased with progress.  No shooting pain anymore.  [facility]      Objective Good mobility, no inc pain.    See Exercise, Manual, and Modality Logs for complete treatment.     Assessment/Plan Pt tolerated tx well with no inc pain.      Goals  Plan Goals: STG  Pt I with HEP in 2 weeks.  MET  To dec pain in neck 1-2/10 max in 2-3 weeks.  MET  To report no radicular symptoms in 2-3 weeks.  MET  LTG  To tolerate lifting 40+ lbs with no inc pain/tingling in 4-6 weeks.  PARTIALLY MET  To tolerate sleep through the night with no inc pain/tingling in 4-6 weeks.  MET  To dec neck pain 0-1/10 max in 4-6 weeks.   MET    Plan to DC with HEP         Timed:         Manual Therapy:    15     mins  68215;     Therapeutic Exercise:    15     mins  60277;     Neuromuscular Shaye:        mins  44101;    Therapeutic Activity:          mins  58748;     Gait Training:           mins  22406;     Ultrasound:         mins  76129;    Ionto                                   mins   95360  Self Care                            mins   70967  Canalith Repos                   mins  51638    Un-Timed:  Electrical Stimulation:    15     mins  38661 ( );  Dry Needling          mins self-pay  Traction          mins 33600  Low Eval          Mins  12718  Mod Eval          Mins  93495  High Eval                            Mins  49405  Re-Eval                               mins  40593    Timed Treatment:   30   mins   Total Treatment:     45   mins    Nhung Mccabe,  PT  Physical Therapist

## 2020-12-03 ENCOUNTER — TELEPHONE (OUTPATIENT)
Dept: FAMILY MEDICINE CLINIC | Facility: CLINIC | Age: 65
End: 2020-12-03

## 2021-01-20 ENCOUNTER — OFFICE VISIT (OUTPATIENT)
Dept: FAMILY MEDICINE CLINIC | Facility: CLINIC | Age: 66
End: 2021-01-20

## 2021-01-20 VITALS
HEIGHT: 64 IN | BODY MASS INDEX: 21.68 KG/M2 | SYSTOLIC BLOOD PRESSURE: 122 MMHG | RESPIRATION RATE: 16 BRPM | HEART RATE: 69 BPM | OXYGEN SATURATION: 99 % | TEMPERATURE: 97.1 F | WEIGHT: 127 LBS | DIASTOLIC BLOOD PRESSURE: 72 MMHG

## 2021-01-20 DIAGNOSIS — I73.00 RAYNAUD'S DISEASE WITHOUT GANGRENE: ICD-10-CM

## 2021-01-20 DIAGNOSIS — Z71.1 WORRIED WELL: Primary | ICD-10-CM

## 2021-01-20 PROCEDURE — 99213 OFFICE O/P EST LOW 20 MIN: CPT | Performed by: FAMILY MEDICINE

## 2021-01-20 NOTE — PROGRESS NOTES
Chief Complaint   Patient presents with   • Swollen Glands     HPI  Mehnaz Raines is a 65 y.o. female that presents for   Chief Complaint   Patient presents with   • Swollen Glands     Lump: patient reports that she noticed a lump to the R side of her neck/throat for the last 8 weeks. No fever, night sweats, weight loss, increased difficulty w/ swallowing, pain w/ swallowing. Lump has been stable over the past 8 weeks and is not painful. Not taking any medications.     Finger swelling: patient notes swelling and color change to finger tips of various fingers, on both hands at times. Exacerbated by cold.    Review of Systems  The following portions of the patient's history were reviewed and updated as appropriate: problem list, past medical history, past surgical history, allergies, current medications, past social history and past family history.    Problem List Tab  Patient History Tab  Immunizations Tab  Medications Tab  Chart Review Tab  Care Everywhere Tab  Synopsis Tab    PE  Vitals:    01/20/21 1435   BP: 122/72   Pulse: 69   Resp: 16   Temp: 97.1 °F (36.2 °C)   SpO2: 99%     Body mass index is 21.8 kg/m².  General: Well nourished, NAD  Head: AT/NC  Eyes: EOMI, anicteric sclera  Neck: Supple, no thyromegaly or LAD.  Normal cricoid cartilage without other appreciable abnormality  Resp: CTAB, SCR, BS equal  CV: RRR w/o m/r/g; 2+ pulses  GI: Soft, NT/ND, +BS  MSK: FROM, no deformity, no edema  Skin: Warm, dry, intact  Neuro: Alert and oriented. No focal deficits  Psych: Appropriate mood and affect    Imaging  No Images in the past 120 days found..    Assessment/Plan   Mehnaz Raines is a 65 y.o. female that presents for   Chief Complaint   Patient presents with   • Swollen Glands     Diagnoses and all orders for this visit:    1. Worried well (Primary): Felt to be normal cricoid cartilage.  Counseled regarding return if having increase in which she feels to be a mass, fever, chills, night sweats, or weight  loss   -Monitor    2. Raynaud's disease without gangrene: Picture shown today and consistent with Raynaud's.  Nifedipine offered but declined at this time.  Patient will consider switching her hydrochlorothiazide for nifedipine at the next visit depending severity of symptoms over the next 2 months   -Counseled regarding wearing gloves   -Consider nifedipine at next visit    Follow-up as needed

## 2021-01-25 NOTE — PROGRESS NOTES
The ABCs of the Annual Wellness Visit  Welcome to Medicare Visit    Chief Complaint   Patient presents with   • Medicare Wellness-Initial Visit       Subjective   History of Present Illness:  Mehnaz Raines is a 64 y.o. female [facility]    OA: patient reports pain in R hand the day following yard work. She takes Tylenol or Motrin w/ good results. 3rd digit affected most. Intermittently w/ catching/trigger finger     Neck pain: patient reports neck pain and numbness into R arm intermittently. At times she is unable to move the arm. She recently had an episode in which she couldn't move the R arm during the day and hadn't been lying on it awkwardly. Not taking medication for this     HTN: 100/62 today. Taking BP at home- generally 112. No LH/dizziness at this time     Anxiety/depression: caring for ill  w/ neurodegenerative disease. Has previously tried SSRIs but didn't like them and discontinued. Not seeing counselor or therapist at this time    HEALTH RISK ASSESSMENT    Recent Hospitalizations:  No hospitalization(s) within the last year.    Current Medical Providers:  Patient Care Team:  Abhishek Maynard MD as PCP - General (Internal Medicine)    Smoking Status:  Social History     Tobacco Use   Smoking Status Never Smoker   Smokeless Tobacco Never Used     Alcohol Consumption:  Social History     Substance and Sexual Activity   Alcohol Use No   • Frequency: Never     Depression Screen:   PHQ-2/PHQ-9 Depression Screening 5/20/2020   Little interest or pleasure in doing things 0   Feeling down, depressed, or hopeless 1   Total Score 1   - worse 2/2 COVID and     Fall Risk Screen:  STEADI Fall Risk Assessment was completed, and patient is at LOW risk for falls.Assessment completed on:5/20/2020  - No falls in last year    Health Habits and Functional and Cognitive Screening:  Functional & Cognitive Status 9/1/2020   Do you have difficulty preparing food and  rf   eating? No   Do you have difficulty bathing yourself, getting dressed or grooming yourself? No   Do you have difficulty using the toilet? No   Do you have difficulty moving around from place to place? No   Do you have trouble with steps or getting out of a bed or a chair? No   Current Diet Well Balanced Diet   Dental Exam Up to date   Eye Exam Up to date   Exercise (times per week) 0 times per week   Current Exercise Activities Include Housecleaning   Do you need help using the phone?  No   Are you deaf or do you have serious difficulty hearing?  No   Do you need help with transportation? No   Do you need help shopping? No   Do you need help preparing meals?  No   Do you need help with housework?  No   Do you need help with laundry? No   Do you need help taking your medications? No   Do you need help managing money? No   Do you ever drive or ride in a car without wearing a seat belt? No   Have you felt unusual stress, anger or loneliness in the last month? No   Who do you live with? Spouse   If you need help, do you have trouble finding someone available to you? No   Have you been bothered in the last four weeks by sexual problems? No   Do you have difficulty concentrating, remembering or making decisions? No   Totally independent    Does the patient have evidence of cognitive impairment? No    Asprin use counseling:Taking ASA appropriately as indicated    Visual Acuity:  No exam data present    Age-appropriate Screening Schedule:  Refer to the list below for future screening recommendations based on patient's age, sex and/or medical conditions. Orders for these recommended tests are listed in the plan section. The patient has been provided with a written plan.    Health Maintenance   Topic Date Due   • COLONOSCOPY  01/07/2020   • LIPID PANEL  01/08/2020   • INFLUENZA VACCINE  08/01/2020   • MAMMOGRAM  08/27/2022   • TDAP/TD VACCINES (2 - Tdap) 05/06/2030   • ZOSTER VACCINE  Completed          The following portions  of the patient's history were reviewed and updated as appropriate: allergies, current medications, past family history, past medical history, past social history, past surgical history and problem list.    Outpatient Medications Prior to Visit   Medication Sig Dispense Refill   • aspirin 81 MG chewable tablet Chew 81 mg Daily.     • cholecalciferol (VITAMIN D3) 25 MCG (1000 UT) tablet Take 1,000 Units by mouth Daily.     • LORazepam (ATIVAN) 0.5 MG tablet Take 1 tablet by mouth 2 (Two) Times a Day As Needed for Anxiety. 50 tablet 0   • primidone (MYSOLINE) 50 MG tablet Take 1 tablet by mouth 2 (Two) Times a Day. 180 tablet 3   • triamterene-hydrochlorothiazide (DYAZIDE) 37.5-25 MG per capsule Take 1 capsule by mouth Daily. 90 capsule 3     No facility-administered medications prior to visit.        Patient Active Problem List   Diagnosis   • Acute bronchitis   • Body aches   • Chest pain   • Dysphonia   • Family history of diabetes mellitus   • Gastroenteritis   • Generalized anxiety disorder   • Hyperlipidemia   • Hypertension   • Depression   • Acquired night blindness   • Vitreous degeneration   • Osteoarthritis   • Cervical radiculopathy       Advanced Care Planning:  ACP discussion was held with the patient during this visit. Patient has an advance directive in EMR which is still valid.     Review of Systems   Constitutional: Negative for chills, fatigue and unexpected weight change.   HENT: Negative for congestion and rhinorrhea.    Eyes: Negative for visual disturbance.   Respiratory: Negative for cough and shortness of breath.    Cardiovascular: Negative for chest pain and palpitations.   Gastrointestinal: Negative for abdominal pain, constipation, diarrhea, nausea and vomiting.   Genitourinary: Negative for difficulty urinating.   Musculoskeletal: Positive for arthralgias and neck pain. Negative for joint swelling.   Skin: Negative for rash.   Neurological: Positive for numbness. Negative for weakness and  "headaches.   Psychiatric/Behavioral: Positive for dysphoric mood. The patient is nervous/anxious.      Compared to one year ago, the patient feels her physical health is the same.  Compared to one year ago, the patient feels her mental health is the same.    Reviewed chart for potential of high risk medication in the elderly: yes  Reviewed chart for potential of harmful drug interactions in the elderly:yes    Objective      Vitals:    09/01/20 1044   BP: 100/62   BP Location: Left arm   Patient Position: Sitting   Cuff Size: Adult   Pulse: 65   Resp: 16   Temp: 97.7 °F (36.5 °C)   TempSrc: Temporal   SpO2: 98%   Weight: 55.3 kg (122 lb)   Height: 162.6 cm (64\")       Body mass index is 20.94 kg/m².  Discussed the patient's BMI with her. The BMI is in the acceptable range.    Physical Exam   Constitutional: She is oriented to person, place, and time. She appears well-developed and well-nourished. No distress.   HENT:   Head: Normocephalic and atraumatic.   Right Ear: External ear normal.   Left Ear: External ear normal.   Nose: Nose normal.   Mouth/Throat: Oropharynx is clear and moist. No oropharyngeal exudate.   Eyes: Pupils are equal, round, and reactive to light. Conjunctivae and EOM are normal. Right eye exhibits no discharge. Left eye exhibits no discharge. No scleral icterus.   Neck: Normal range of motion. Neck supple. No thyromegaly present.   Cardiovascular: Normal rate, regular rhythm, normal heart sounds and intact distal pulses.   No murmur heard.  Pulmonary/Chest: Effort normal and breath sounds normal. No respiratory distress. She has no wheezes. She has no rales.   Abdominal: Soft. Bowel sounds are normal. She exhibits no distension. There is no tenderness.   Musculoskeletal: Normal range of motion. She exhibits no edema or deformity.   Lymphadenopathy:     She has no cervical adenopathy.   Neurological: She is alert and oriented to person, place, and time. No cranial nerve deficit or sensory deficit. "   Skin: Skin is warm and dry. Capillary refill takes less than 2 seconds. No rash noted.   Psychiatric: She has a normal mood and affect. Her behavior is normal. Thought content normal.         Procedures    Assessment/Plan   Medicare Risks and Personalized Health Plan  CMS Preventative Services Quick Reference  Advance Directive Discussion  Breast Cancer/Mammogram Screening  Cardiovascular risk  Chronic Pain   Colon Cancer Screening  Dementia/Memory   Depression/Dysphoria  Diabetic Lab Screening   Fall Risk  Immunizations Discussed/Encouraged (specific immunizations; Influenza and Pneumococcal 23 )  Osteoprorosis Risk    The above risks/problems have been discussed with the patient.  Pertinent information has been shared with the patient in the After Visit Summary.  Follow up plans and orders are seen below in the Assessment/Plan Section.    Diagnoses and all orders for this visit:    1. Preventative health care (Primary)  -     CBC & Differential  -     Comprehensive Metabolic Panel  -     Hemoglobin A1c  -     Lipid Panel  -     T4, Free  -     TSH  -     Vitamin D 25 Hydroxy  -     Vitamin B12  -     Pneumococcal Polysaccharide Vaccine 23-Valent Greater Than or Equal To 1yo Subcutaneous / IM  -     DEXA Bone Density Axial  -     CBC Auto Differential  -  Counseled regarding diet, exercise, weight loss, and preventative health maintenance items/immunizations below    2. Essential hypertension: 100/62 today.  Does not need to be this low  -     Comprehensive Metabolic Panel  - Discontinue Dyazide 37.5/25 mg daily  - Monitor blood pressure at home    3. Generalized anxiety disorder:  Anxiety/depression surrounding caring for ill  with degenerative disease.  This is overwhelming at times.  Will work on respite  -     escitalopram (LEXAPRO) 5 MG tablet; Take 1 tablet by mouth Daily.  Dispense: 90 tablet; Refill: 3  - Patient prefers no counseling/therapy at this time    4. Osteoarthritis of right hand,  unspecified osteoarthritis type  -     XR Hand 3+ View Right; Future  - Continue Tylenol/ibuprofen as needed    5. Cervical radiculopathy  -     Ambulatory Referral to Physical Therapy Evaluate and treat  -     XR Spine Cervical Complete 4 or 5 View; Future  - Continue Tylenol/ibuprofen as needed    6. Vitamin D deficiency, unspecified   -     Vitamin D 25 Hydroxy    7. Encounter for immunization   -     Pneumococcal Polysaccharide Vaccine 23-Valent Greater Than or Equal To 3yo Subcutaneous / IM    8. Abnormal findings on diagnostic imaging of other parts of musculoskeletal system   -     DEXA Bone Density Axial    Follow Up:  Return in about 6 months (around 3/1/2021) for anxiety/depression.     An After Visit Summary and PPPS were given to the patient.     Preventative:   Colonoscopy: due 2021  Mammogram: 8/2020  Pap smear: recent abnormal but normal colposcopy- follows aron/ [facility]  DEXA: Ordered today  Shingles: Completed  Pneumonia: Pneumovax ordered today  Tdap: 5/2020  Influenza: 2019, recommended

## 2021-03-03 ENCOUNTER — OFFICE VISIT (OUTPATIENT)
Dept: FAMILY MEDICINE CLINIC | Facility: CLINIC | Age: 66
End: 2021-03-03

## 2021-03-03 VITALS
RESPIRATION RATE: 16 BRPM | TEMPERATURE: 97.3 F | OXYGEN SATURATION: 98 % | SYSTOLIC BLOOD PRESSURE: 108 MMHG | HEART RATE: 62 BPM | BODY MASS INDEX: 21.51 KG/M2 | WEIGHT: 126 LBS | HEIGHT: 64 IN | DIASTOLIC BLOOD PRESSURE: 68 MMHG

## 2021-03-03 DIAGNOSIS — I10 ESSENTIAL HYPERTENSION: Primary | ICD-10-CM

## 2021-03-03 DIAGNOSIS — E78.5 HYPERLIPIDEMIA, UNSPECIFIED HYPERLIPIDEMIA TYPE: ICD-10-CM

## 2021-03-03 DIAGNOSIS — Z00.00 PREVENTATIVE HEALTH CARE: ICD-10-CM

## 2021-03-03 DIAGNOSIS — F41.1 GENERALIZED ANXIETY DISORDER: ICD-10-CM

## 2021-03-03 PROCEDURE — 99214 OFFICE O/P EST MOD 30 MIN: CPT | Performed by: FAMILY MEDICINE

## 2021-03-03 RX ORDER — HYDROCHLOROTHIAZIDE 25 MG/1
12.5 TABLET ORAL DAILY
Qty: 90 TABLET | Refills: 3 | Status: SHIPPED | OUTPATIENT
Start: 2021-03-03 | End: 2021-09-01

## 2021-03-03 NOTE — PROGRESS NOTES
Chief Complaint   Patient presents with   • Anxiety     6mo   • Depression     HPI  Mehnaz Raines is a 65 y.o. female that presents for   Chief Complaint   Patient presents with   • Anxiety     6mo   • Depression     Anxiety: she reports stress is much improved.  is occupied w/ skyping, she joined [email or web address] at [email or web address], as well as Lexapro 5 daily. Happy w/ current control and needing changes at this time. Using Ativan 0.5mg about 2-3x/month    HLD: patient has made diet changes since her last visit when cholesterol was elevated. Not maintained on any medication at this time.    HTN: 108/68 today. Maintained on HCTZ 25 daily. Generally 125-130 at home. Will get HA if BP is mildly elevated. Occasional LH/dizziness w/ exercise. No CP or SOB    Review of Systems   Constitutional: Negative for chills, fever and unexpected weight loss.   Eyes: Negative for visual disturbance.   Respiratory: Negative for cough and shortness of breath.    Cardiovascular: Negative for chest pain and palpitations.   Neurological: Positive for dizziness and light-headedness.   Psychiatric/Behavioral: Positive for stress. The patient is not nervous/anxious.      The following portions of the patient's history were reviewed and updated as appropriate: problem list, past medical history, past surgical history, allergies, current medication    Problem List Tab  Patient History Tab  Immunizations Tab  Medications Tab  Chart Review Tab  Care Everywhere Tab  Synopsis Tab    PE  Vitals:    03/03/21 1406   BP: 108/68   Pulse: 62   Resp: 16   Temp: 97.3 °F (36.3 °C)   SpO2: 98%     Body mass index is 21.63 kg/m².  General: Well nourished, NAD  Head: AT/NC  Eyes: EOMI, anicteric sclera  Resp: CTAB, SCR, BS equal  CV: RRR w/o m/r/g; 2+ pulses  GI: Soft, NT/ND, +BS  MSK: FROM, no deformity, no edema  Skin: Warm, dry, intact  Neuro: Alert and oriented. No focal deficits  Psych: Appropriate mood and affect    Imaging  No Images in the past 120  days found..    Assessment/Plan   Mehnaz Raines is a 65 y.o. female that presents for   Chief Complaint   Patient presents with   • Anxiety     6mo   • Depression     Diagnoses and all orders for this visit:    1. Essential hypertension (Primary): 108/68 today   -Reduce hydrochlorothiazide to 12.5 mg daily    2. Hyperlipidemia, unspecified hyperlipidemia type: Patient working on lifestyle modification.  Not maintained on medication.  Prefers to wait till next visit to obtain lipid panel as she is not fasting today   -Monitor; continue lifestyle modification    3. Generalized anxiety disorder: Well-controlled at this time   -Continue home Lexapro 5 daily   -Continue regular exercise    Other orders  -     Cancel: Hepatitis C Antibody  -     hydroCHLOROthiazide (HYDRODIURIL) 25 MG tablet; Take 0.5 tablets by mouth Daily.  Dispense: 90 tablet; Refill: 3    Return in about 6 months (around 9/3/2021) for Medicare Wellness.

## 2021-06-04 DIAGNOSIS — R49.0 DYSPHONIA: ICD-10-CM

## 2021-06-04 RX ORDER — PRIMIDONE 50 MG/1
TABLET ORAL
Qty: 180 TABLET | Refills: 3 | Status: SHIPPED | OUTPATIENT
Start: 2021-06-04 | End: 2022-06-01

## 2021-07-14 ENCOUNTER — TRANSCRIBE ORDERS (OUTPATIENT)
Dept: ADMINISTRATIVE | Facility: HOSPITAL | Age: 66
End: 2021-07-14

## 2021-07-14 DIAGNOSIS — Z12.31 VISIT FOR SCREENING MAMMOGRAM: Primary | ICD-10-CM

## 2021-09-01 ENCOUNTER — HOSPITAL ENCOUNTER (OUTPATIENT)
Dept: MAMMOGRAPHY | Facility: HOSPITAL | Age: 66
Discharge: HOME | End: 2021-09-01
Admitting: OBSTETRICS & GYNECOLOGY

## 2021-09-01 DIAGNOSIS — Z12.31 VISIT FOR SCREENING MAMMOGRAM: ICD-10-CM

## 2021-09-01 DIAGNOSIS — F41.1 GENERALIZED ANXIETY DISORDER: ICD-10-CM

## 2021-09-01 PROCEDURE — 77067 SCR MAMMO BI INCL CAD: CPT

## 2021-09-01 PROCEDURE — 77063 BREAST TOMOSYNTHESIS BI: CPT

## 2021-09-01 RX ORDER — HYDROCHLOROTHIAZIDE 25 MG/1
TABLET ORAL
Qty: 90 TABLET | Refills: 3 | Status: SHIPPED | OUTPATIENT
Start: 2021-09-01 | End: 2021-09-29

## 2021-09-01 RX ORDER — ESCITALOPRAM OXALATE 5 MG/1
TABLET ORAL
Qty: 90 TABLET | Refills: 3 | Status: SHIPPED | OUTPATIENT
Start: 2021-09-01 | End: 2022-06-10

## 2021-09-29 ENCOUNTER — HOSPITAL ENCOUNTER (OUTPATIENT)
Dept: ULTRASOUND IMAGING | Facility: HOSPITAL | Age: 66
Discharge: HOME | End: 2021-09-29

## 2021-09-29 ENCOUNTER — HOSPITAL ENCOUNTER (OUTPATIENT)
Dept: MAMMOGRAPHY | Facility: HOSPITAL | Age: 66
Discharge: HOME | End: 2021-09-29

## 2021-09-29 ENCOUNTER — OFFICE VISIT (OUTPATIENT)
Dept: FAMILY MEDICINE CLINIC | Facility: CLINIC | Age: 66
End: 2021-09-29

## 2021-09-29 ENCOUNTER — LAB (OUTPATIENT)
Dept: FAMILY MEDICINE CLINIC | Facility: CLINIC | Age: 66
End: 2021-09-29

## 2021-09-29 VITALS
RESPIRATION RATE: 16 BRPM | DIASTOLIC BLOOD PRESSURE: 70 MMHG | TEMPERATURE: 97.5 F | WEIGHT: 123 LBS | HEIGHT: 64 IN | OXYGEN SATURATION: 97 % | SYSTOLIC BLOOD PRESSURE: 118 MMHG | HEART RATE: 64 BPM | BODY MASS INDEX: 21 KG/M2

## 2021-09-29 DIAGNOSIS — F41.1 GENERALIZED ANXIETY DISORDER: ICD-10-CM

## 2021-09-29 DIAGNOSIS — I10 ESSENTIAL HYPERTENSION: ICD-10-CM

## 2021-09-29 DIAGNOSIS — R49.0 DYSPHONIA: ICD-10-CM

## 2021-09-29 DIAGNOSIS — E55.9 VITAMIN D DEFICIENCY, UNSPECIFIED: ICD-10-CM

## 2021-09-29 DIAGNOSIS — N64.89 BREAST ASYMMETRY: ICD-10-CM

## 2021-09-29 DIAGNOSIS — Z00.00 MEDICARE ANNUAL WELLNESS VISIT, SUBSEQUENT: Primary | ICD-10-CM

## 2021-09-29 DIAGNOSIS — Z11.59 NEED FOR HEPATITIS C SCREENING TEST: ICD-10-CM

## 2021-09-29 PROBLEM — R52 BODY ACHES: Status: RESOLVED | Noted: 2019-01-04 | Resolved: 2021-09-29

## 2021-09-29 LAB
25(OH)D3 SERPL-MCNC: 49.2 NG/ML (ref 30–100)
ALBUMIN SERPL-MCNC: 4.5 G/DL (ref 3.5–5.2)
ALBUMIN/GLOB SERPL: 1.7 G/DL
ALP SERPL-CCNC: 92 U/L (ref 39–117)
ALT SERPL W P-5'-P-CCNC: 14 U/L (ref 1–33)
ANION GAP SERPL CALCULATED.3IONS-SCNC: 8.9 MMOL/L (ref 5–15)
AST SERPL-CCNC: 26 U/L (ref 1–32)
BASOPHILS # BLD AUTO: 0.05 10*3/MM3 (ref 0–0.2)
BASOPHILS NFR BLD AUTO: 0.8 % (ref 0–1.5)
BILIRUB SERPL-MCNC: 0.4 MG/DL (ref 0–1.2)
BUN SERPL-MCNC: 9 MG/DL (ref 8–23)
BUN/CREAT SERPL: 15.5 (ref 7–25)
CALCIUM SPEC-SCNC: 9.5 MG/DL (ref 8.6–10.5)
CHLORIDE SERPL-SCNC: 104 MMOL/L (ref 98–107)
CHOLEST SERPL-MCNC: 211 MG/DL (ref 0–200)
CO2 SERPL-SCNC: 28.1 MMOL/L (ref 22–29)
CREAT SERPL-MCNC: 0.58 MG/DL (ref 0.57–1)
DEPRECATED RDW RBC AUTO: 45 FL (ref 37–54)
EOSINOPHIL # BLD AUTO: 0.1 10*3/MM3 (ref 0–0.4)
EOSINOPHIL NFR BLD AUTO: 1.6 % (ref 0.3–6.2)
ERYTHROCYTE [DISTWIDTH] IN BLOOD BY AUTOMATED COUNT: 12.4 % (ref 12.3–15.4)
GFR SERPL CREATININE-BSD FRML MDRD: 104 ML/MIN/1.73
GLOBULIN UR ELPH-MCNC: 2.6 GM/DL
GLUCOSE SERPL-MCNC: 78 MG/DL (ref 65–99)
HBA1C MFR BLD: 5.6 % (ref 3.5–5.6)
HCT VFR BLD AUTO: 41.3 % (ref 34–46.6)
HCV AB SER DONR QL: NORMAL
HDLC SERPL-MCNC: 66 MG/DL (ref 40–60)
HGB BLD-MCNC: 13.4 G/DL (ref 12–15.9)
IMM GRANULOCYTES # BLD AUTO: 0.02 10*3/MM3 (ref 0–0.05)
IMM GRANULOCYTES NFR BLD AUTO: 0.3 % (ref 0–0.5)
LDLC SERPL CALC-MCNC: 130 MG/DL (ref 0–100)
LDLC/HDLC SERPL: 1.93 {RATIO}
LYMPHOCYTES # BLD AUTO: 2.09 10*3/MM3 (ref 0.7–3.1)
LYMPHOCYTES NFR BLD AUTO: 33.5 % (ref 19.6–45.3)
MCH RBC QN AUTO: 31.8 PG (ref 26.6–33)
MCHC RBC AUTO-ENTMCNC: 32.4 G/DL (ref 31.5–35.7)
MCV RBC AUTO: 97.9 FL (ref 79–97)
MONOCYTES # BLD AUTO: 0.54 10*3/MM3 (ref 0.1–0.9)
MONOCYTES NFR BLD AUTO: 8.7 % (ref 5–12)
NEUTROPHILS NFR BLD AUTO: 3.43 10*3/MM3 (ref 1.7–7)
NEUTROPHILS NFR BLD AUTO: 55.1 % (ref 42.7–76)
NRBC BLD AUTO-RTO: 0 /100 WBC (ref 0–0.2)
PLATELET # BLD AUTO: 276 10*3/MM3 (ref 140–450)
PMV BLD AUTO: 10.3 FL (ref 6–12)
POTASSIUM SERPL-SCNC: 4.4 MMOL/L (ref 3.5–5.2)
PROT SERPL-MCNC: 7.1 G/DL (ref 6–8.5)
RBC # BLD AUTO: 4.22 10*6/MM3 (ref 3.77–5.28)
SODIUM SERPL-SCNC: 141 MMOL/L (ref 136–145)
T4 FREE SERPL-MCNC: 1.11 NG/DL (ref 0.93–1.7)
TRIGL SERPL-MCNC: 87 MG/DL (ref 0–150)
TSH SERPL DL<=0.05 MIU/L-ACNC: 1.5 UIU/ML (ref 0.27–4.2)
VIT B12 BLD-MCNC: 367 PG/ML (ref 211–946)
VLDLC SERPL-MCNC: 15 MG/DL (ref 5–40)
WBC # BLD AUTO: 6.23 10*3/MM3 (ref 3.4–10.8)

## 2021-09-29 PROCEDURE — 77065 DX MAMMO INCL CAD UNI: CPT

## 2021-09-29 PROCEDURE — 84439 ASSAY OF FREE THYROXINE: CPT | Performed by: FAMILY MEDICINE

## 2021-09-29 PROCEDURE — 82306 VITAMIN D 25 HYDROXY: CPT | Performed by: FAMILY MEDICINE

## 2021-09-29 PROCEDURE — 36415 COLL VENOUS BLD VENIPUNCTURE: CPT | Performed by: FAMILY MEDICINE

## 2021-09-29 PROCEDURE — 86803 HEPATITIS C AB TEST: CPT | Performed by: FAMILY MEDICINE

## 2021-09-29 PROCEDURE — 82607 VITAMIN B-12: CPT | Performed by: FAMILY MEDICINE

## 2021-09-29 PROCEDURE — 80061 LIPID PANEL: CPT | Performed by: FAMILY MEDICINE

## 2021-09-29 PROCEDURE — 80053 COMPREHEN METABOLIC PANEL: CPT | Performed by: FAMILY MEDICINE

## 2021-09-29 PROCEDURE — 84443 ASSAY THYROID STIM HORMONE: CPT | Performed by: FAMILY MEDICINE

## 2021-09-29 PROCEDURE — G0279 TOMOSYNTHESIS, MAMMO: HCPCS

## 2021-09-29 PROCEDURE — 83036 HEMOGLOBIN GLYCOSYLATED A1C: CPT | Performed by: FAMILY MEDICINE

## 2021-09-29 PROCEDURE — G0439 PPPS, SUBSEQ VISIT: HCPCS | Performed by: FAMILY MEDICINE

## 2021-09-29 PROCEDURE — 85025 COMPLETE CBC W/AUTO DIFF WBC: CPT | Performed by: FAMILY MEDICINE

## 2021-09-29 RX ORDER — MULTIVIT WITH MINERALS/LUTEIN
500 TABLET ORAL DAILY
COMMUNITY
End: 2022-09-23 | Stop reason: ALTCHOICE

## 2021-09-29 NOTE — PROGRESS NOTES
The ABCs of the Annual Wellness Visit  Subsequent Medicare Wellness Visit    Chief Complaint   Patient presents with   • Medicare Wellness-subsequent      Subjective    History of Present Illness:  Mehnaz Raines is a 65 y.o. female who presents for a Subsequent Medicare Wellness Visit.    HTN: 118/70 today. Recently discontinued HCTZ. BP has generally been right around this number at home. No LH/dizziness, CP or SOB    Anxiety/depression: maintained on Lexapro 5 daily. Only requiring Ativan 0.5mg about 1-2x/month. Happy w/ current control. In a good space.     Vocal cord tremor: maintained on primidone 50 BID. This provides good relief. Seems to be improving. Happy w/ current control.     The following portions of the patient's history were reviewed and   updated as appropriate: allergies, current medications, past family history, past medical history, past social history, past surgical history and problem list.    Compared to one year ago, the patient feels her physical   health is the same.    Compared to one year ago, the patient feels her mental   health is better.    Recent Hospitalizations:  She was not admitted to the hospital during the last year.     Current Medical Providers:  Patient Care Team:  Abhishek Maynard MD as PCP - General (Internal Medicine)    Outpatient Medications Prior to Visit   Medication Sig Dispense Refill   • aspirin 81 MG chewable tablet Chew 81 mg Daily.     • Calcium Carbonate-Vit D-Min (CALCIUM 1200 PO) Take  by mouth.     • cholecalciferol (VITAMIN D3) 25 MCG (1000 UT) tablet Take 1,000 Units by mouth Daily.     • escitalopram (LEXAPRO) 5 MG tablet TAKE 1 TABLET BY MOUTH EVERY DAY 90 tablet 3   • LORazepam (ATIVAN) 0.5 MG tablet Take 1 tablet by mouth 2 (Two) Times a Day As Needed for Anxiety. 50 tablet 0   • Magnesium Oxide (MAG-200 PO) Take  by mouth.     • primidone (MYSOLINE) 50 MG tablet TAKE 1 TABLET BY MOUTH TWICE A  tablet 3   • vitamin C (ASCORBIC ACID) 250  MG tablet Take 500 mg by mouth Daily.     • Zinc 50 MG capsule Take  by mouth.     • hydroCHLOROthiazide (HYDRODIURIL) 25 MG tablet TAKE 1 TABLET BY MOUTH EVERY DAY 90 tablet 3     No facility-administered medications prior to visit.     No opioid medication identified on active medication list. I have reviewed chart for other potential  high risk medication/s and harmful drug interactions in the elderly.        Aspirin is on active medication list. Aspirin use is indicated based on review of current medical condition/s. Pros and cons of this therapy have been discussed today. Benefits of this medication outweigh potential harm.  Patient has been encouraged to continue taking this medication.  .    Patient Active Problem List   Diagnosis   • Chest pain   • Dysphonia   • Family history of diabetes mellitus   • Gastroenteritis   • Generalized anxiety disorder   • Hyperlipidemia   • Hypertension   • Depression   • Acquired night blindness   • Vitreous degeneration   • Osteoarthritis   • Cervical radiculopathy     Advance Care Planning  Advance Directive is on file.  ACP discussion was held with the patient during this visit. Patient has an advance directive in EMR which is still valid.   Review of Systems   Constitutional: Negative for chills and fever.   HENT: Negative for congestion and rhinorrhea.    Eyes: Negative for visual disturbance.   Respiratory: Negative for cough and shortness of breath.    Cardiovascular: Negative for chest pain and palpitations.   Gastrointestinal: Negative for abdominal pain and vomiting.   Genitourinary: Negative for difficulty urinating and dysuria.   Musculoskeletal: Negative for arthralgias and back pain.   Skin: Negative for rash.   Neurological: Negative for dizziness and light-headedness.   Psychiatric/Behavioral: Negative for dysphoric mood and sleep disturbance. The patient is not nervous/anxious.         Objective    Vitals:    09/29/21 1017   BP: 118/70   BP Location: Left arm  "  Patient Position: Sitting   Cuff Size: Adult   Pulse: 64   Resp: 16   Temp: 97.5 °F (36.4 °C)   TempSrc: Temporal   SpO2: 97%   Weight: 55.8 kg (123 lb)   Height: 162.6 cm (64\")     BMI Readings from Last 1 Encounters:   09/29/21 21.11 kg/m²   BMI is within normal parameters. No follow-up required.    Does the patient have evidence of cognitive impairment? No    Physical Exam  Constitutional:       General: She is not in acute distress.     Appearance: She is well-developed.   HENT:      Head: Normocephalic and atraumatic.      Right Ear: Tympanic membrane and external ear normal. There is impacted cerumen.      Left Ear: Tympanic membrane and external ear normal. There is no impacted cerumen.      Nose: Nose normal.      Mouth/Throat:      Mouth: Mucous membranes are moist.      Pharynx: No oropharyngeal exudate or posterior oropharyngeal erythema.   Eyes:      General: No scleral icterus.        Right eye: No discharge.         Left eye: No discharge.      Extraocular Movements: Extraocular movements intact.      Conjunctiva/sclera: Conjunctivae normal.      Pupils: Pupils are equal, round, and reactive to light.   Neck:      Thyroid: No thyromegaly.      Vascular: No carotid bruit.   Cardiovascular:      Rate and Rhythm: Normal rate and regular rhythm.      Heart sounds: Normal heart sounds. No murmur heard.     Pulmonary:      Effort: Pulmonary effort is normal. No respiratory distress.      Breath sounds: Normal breath sounds. No wheezing or rales.   Abdominal:      General: Bowel sounds are normal. There is no distension.      Palpations: Abdomen is soft.      Tenderness: There is no abdominal tenderness.   Musculoskeletal:         General: No deformity. Normal range of motion.      Cervical back: Normal range of motion and neck supple.   Lymphadenopathy:      Cervical: No cervical adenopathy.   Skin:     General: Skin is warm and dry.      Findings: No rash.   Neurological:      General: No focal deficit " present.      Mental Status: She is alert and oriented to person, place, and time. Mental status is at baseline.      Cranial Nerves: No cranial nerve deficit.      Sensory: No sensory deficit.   Psychiatric:         Behavior: Behavior normal.         Thought Content: Thought content normal.       Lab Results   Component Value Date    TRIG 87 09/29/2021    HDL 66 (H) 09/29/2021     (H) 09/29/2021    VLDL 15 09/29/2021    HGBA1C 5.6 09/29/2021        HEALTH RISK ASSESSMENT    Smoking Status:  Social History     Tobacco Use   Smoking Status Never Smoker   Smokeless Tobacco Never Used     Alcohol Consumption:  Social History     Substance and Sexual Activity   Alcohol Use No     Fall Risk Screen:  STEADI Fall Risk Assessment was completed, and patient is at LOW risk for falls.Assessment completed on:9/29/2021    Depression Screening:  PHQ-2/PHQ-9 Depression Screening 9/29/2021   Little interest or pleasure in doing things 0   Feeling down, depressed, or hopeless 0   Total Score 0   Maintained on Lexapro 5mg daily. Happy w/ control    Health Habits and Functional and Cognitive Screening:  Functional & Cognitive Status 9/29/2021   Do you have difficulty preparing food and eating? No   Do you have difficulty bathing yourself, getting dressed or grooming yourself? No   Do you have difficulty using the toilet? No   Do you have difficulty moving around from place to place? No   Do you have trouble with steps or getting out of a bed or a chair? No   Current Diet Well Balanced Diet   Dental Exam Up to date   Eye Exam Up to date   Exercise (times per week) 7 times per week   Current Exercises Include Walking        Exercise Comment silver sneakers   Current Exercise Activities Include -   Do you need help using the phone?  No   Are you deaf or do you have serious difficulty hearing?  No   Do you need help with transportation? No   Do you need help shopping? No   Do you need help preparing meals?  No   Do you need help  with housework?  No   Do you need help with laundry? No   Do you need help taking your medications? No   Do you need help managing money? No   Do you ever drive or ride in a car without wearing a seat belt? No   Have you felt unusual stress, anger or loneliness in the last month? No   Who do you live with? Spouse   If you need help, do you have trouble finding someone available to you? No   Have you been bothered in the last four weeks by sexual problems? No   Do you have difficulty concentrating, remembering or making decisions? Yes     Age-appropriate Screening Schedule:  Refer to the list below for future screening recommendations based on patient's age, sex and/or medical conditions. Orders for these recommended tests are listed in the plan section. The patient has been provided with a written plan.    Health Maintenance   Topic Date Due   • INFLUENZA VACCINE  10/01/2021   • DXA SCAN  09/10/2022   • LIPID PANEL  09/29/2022   • MAMMOGRAM  09/29/2023   • TDAP/TD VACCINES (2 - Td or Tdap) 05/06/2030   • ZOSTER VACCINE  Completed        Assessment/Plan   CMS Preventative Services Quick Reference  Risk Factors Identified During Encounter  Depression/Dysphoria  Immunizations Discussed/Encouraged (specific Immunizations; Influenza and COVID19  Inadequate Social Support, Isolation, Loneliness, Lack of Transportation, Financial Difficulties, or Caregiver Stress   The above risks/problems have been discussed with the patient.  Follow up actions/plans if indicated are seen below in the Assessment/Plan Section.  Pertinent information has been shared with the patient in the After Visit Summary.    Diagnoses and all orders for this visit:    1. Medicare annual wellness visit, subsequent (Primary)  -     Hepatitis C Antibody  -     CBC & Differential  -     Comprehensive Metabolic Panel  -     Hemoglobin A1c  -     Lipid Panel  -     TSH  -     T4, Free  -     Vitamin D 25 Hydroxy  -     Vitamin B12  -  Counseled regarding  exercise and preventative health maintenance items/immunizations below    2. Need for hepatitis C screening test  -     Hepatitis C Antibody    3. Essential hypertension: 118/70 today.  Recently discontinued medication   -Monitor    4. Generalized anxiety disorder   -Continue home Lexapro 5 daily with Ativan 0.5 as needed (rarely used)   -Monitor closely for respite needs    5. Dysphonia   -Continue home primidone 50 twice daily    6. Vitamin D deficiency, unspecified   -     Vitamin D 25 Hydroxy    Preventative:   Colonoscopy: 5/2017, due 2021  Mammogram: 9/2021- getting diagnostic today  Pap smear: 7/2021 (abnormal)- follows domi Fuentes  DEXA: 9/2020- normal  Shingles: Completed- 2/2020  Pneumonia: Pneumovax 9/2020  Tdap: 5/2020  Influenza: 10/2020, recommended  COVID: Recommended    Follow Up:   Return in about 1 year (around 9/29/2022) for Medicare Wellness.     An After Visit Summary and PPPS were made available to the patient.

## 2021-10-21 ENCOUNTER — TELEPHONE (OUTPATIENT)
Dept: FAMILY MEDICINE CLINIC | Facility: CLINIC | Age: 66
End: 2021-10-21

## 2021-10-21 NOTE — TELEPHONE ENCOUNTER
Caller: Mehnaz Raines A    Relationship to patient: Self    Best call back number: [phone]    Patient is needing: PATIENT SAID SINCE SHE STOPPED TAKING HER BLOOD PRESSURE MEDICATION, SHE HEARS A CONSTANT SWOOSHING SOUND IN HER EARS IN RHYTHM WITH HER HEART BEAT     THE PATIENT SAID IT IS WORSE ON THE LEFT     THE PATIENT SAID SHE DOESN'T THINK IT'S A PROBLEM WITH HER EARS, BUT HAS MADE AN APPOINTMENT WITH AN EAR DOCTOR FOR NEXT Monday     SHE HAS BEEN CHECKING HER BLOOD PRESSURE AT HOME AND IT SEEMS TO BE NORMAL, STAYING IN  - 120 RANGE     SHE SAID SHE ALSO HAS AN INTERMITTENT HEADACHE SOMETIMES, BUT NOTHING BAD     WANTING TO SEE WHAT DR. HENLEY WANTS HER TO DO

## 2021-10-22 ENCOUNTER — OFFICE VISIT (OUTPATIENT)
Dept: FAMILY MEDICINE CLINIC | Facility: CLINIC | Age: 66
End: 2021-10-22

## 2021-10-22 VITALS
WEIGHT: 127 LBS | HEART RATE: 68 BPM | RESPIRATION RATE: 14 BRPM | SYSTOLIC BLOOD PRESSURE: 116 MMHG | BODY MASS INDEX: 21.8 KG/M2 | DIASTOLIC BLOOD PRESSURE: 64 MMHG

## 2021-10-22 DIAGNOSIS — H61.21 IMPACTED CERUMEN OF RIGHT EAR: ICD-10-CM

## 2021-10-22 DIAGNOSIS — H93.A3 PULSATILE TINNITUS OF BOTH EARS: Primary | ICD-10-CM

## 2021-10-22 PROCEDURE — 99212 OFFICE O/P EST SF 10 MIN: CPT | Performed by: NURSE PRACTITIONER

## 2021-10-22 NOTE — PROGRESS NOTES
Answers for HPI/ROS submitted by the patient on 10/22/2021  Please describe your symptoms.: Pulsation in my head worse on the left and worse in the late evenings  Have you had these symptoms before?: No  How long have you been having these symptoms?: 5-7 days  Please describe any probable cause for these symptoms. : Neck discomfort  What is the primary reason for your visit?: Other    Chief Complaint  Tinnitus      Subjective          Mehnaz Raines presents to [facility]  History of Present Illness    1 week ago started having a swooshing sound in her head, is worse on the left, but feels it on the right, goes with her pulse  Initially it was so loud she could not concentrate on her study  Is worse it the evenings  Has noticed it during the day when the house is quiet  Last night it seemed to get louder when she was looking up at a high shelf  Thinks that at times it messes with her vision, describes a shimmering with her vision    H/o HTN, hyperlipidemia, hoarseness, anxiety, OA, cervical radiculopathy  Meds: asa, calcium + d, vitmain d, lexapro, ativan, mag-ox, mysoline, vitmain c, zinc    Recently was taken off of HCTZ about 6 weeks ago  Does check her blood pressures at home, noticed it was initially up in the 140s when this started, but has returned to 110s-120s/60s-70s    Does have chronic neck pain    Has also made an appointment with ENT, Dr. Mckee for Monday    Review of Systems   Constitutional: Negative.  Negative for fatigue and fever.   HENT: Positive for ear pain and rhinorrhea.         Maybe once yesterday had a little ear pain on the right    deneis that her hearing is effected, tells me that her  tells her that she cannot hear   Respiratory: Negative for cough and shortness of breath.    Cardiovascular: Negative for chest pain and palpitations.   Gastrointestinal: Positive for nausea.        Had some nausea with the initial episode   Musculoskeletal:  Positive for arthralgias.        Chronic neck pain   Allergic/Immunologic: Positive for environmental allergies.   Neurological: Negative for dizziness, weakness and headaches.   Psychiatric/Behavioral: Positive for sleep disturbance.     Objective   Vital Signs:   /64 (BP Location: Right arm, Patient Position: Sitting)   Pulse 68   Resp 14   Wt 57.6 kg (127 lb)   BMI 21.80 kg/m²     Physical Exam  Vitals reviewed.   Constitutional:       Appearance: Normal appearance.   HENT:      Right Ear: Ear canal and external ear normal.      Left Ear: Tympanic membrane, ear canal and external ear normal.      Nose: Nose normal.      Mouth/Throat:      Mouth: Mucous membranes are moist.   Eyes:      Extraocular Movements: Extraocular movements intact.   Neck:      Vascular: No carotid bruit.   Cardiovascular:      Rate and Rhythm: Normal rate and regular rhythm.      Pulses: Normal pulses.      Heart sounds: Normal heart sounds.   Pulmonary:      Effort: Pulmonary effort is normal.      Breath sounds: Normal breath sounds.   Abdominal:      General: Bowel sounds are normal.      Palpations: Abdomen is soft.   Musculoskeletal:         General: Normal range of motion.      Cervical back: Normal range of motion and neck supple. No tenderness.   Lymphadenopathy:      Cervical: No cervical adenopathy.   Skin:     General: Skin is warm.      Capillary Refill: Capillary refill takes less than 2 seconds.   Neurological:      Mental Status: She is alert.        Result Review :     CMP    CMP 9/29/21   Glucose 78   BUN 9   Creatinine 0.58   eGFR Non African Am 104   Sodium 141   Potassium 4.4   Chloride 104   Calcium 9.5   Albumin 4.50   Total Bilirubin 0.4   Alkaline Phosphatase 92   AST (SGOT) 26   ALT (SGPT) 14           TSH    TSH 9/29/21   TSH 1.500                     Assessment and Plan    Diagnoses and all orders for this visit:    1. Pulsatile tinnitus of both ears (Primary)    2. Impacted cerumen of right ear  -      Ear Cerumen Removal    continue with plan to see ENT as should have hearing tested, consider imaging if problem is unrelated to ENT/hearing issue and persists      Follow Up   No follow-ups on file.  Patient was given instructions and counseling regarding her condition or for health maintenance advice. Please see specific information pulled into the AVS if appropriate.

## 2021-10-27 ENCOUNTER — TELEPHONE (OUTPATIENT)
Dept: FAMILY MEDICINE CLINIC | Facility: CLINIC | Age: 66
End: 2021-10-27

## 2021-10-27 NOTE — TELEPHONE ENCOUNTER
Caller: Mehnaz Raines    Relationship to patient: Self    Best call back number: [phone]    MEHNAZ CALLED TO INFORM US THAT  DR OSORIO WITH EAR NOSE AND THROAT STATED MEHNAZ HAS A LEFT BLOCKED EUSTACHIAN TUBE AND HEARING WAS DOWN. HE ADVISED PATIENT TO USE HEARING AIDS BUT SHE IS NOT READY FOR THAT YET AND WILL BE USING NASAL SPRAYS PRESCRIBED.

## 2022-04-26 ENCOUNTER — OFFICE VISIT (OUTPATIENT)
Dept: FAMILY MEDICINE CLINIC | Facility: CLINIC | Age: 67
End: 2022-04-26

## 2022-04-26 VITALS
RESPIRATION RATE: 16 BRPM | HEIGHT: 64 IN | SYSTOLIC BLOOD PRESSURE: 108 MMHG | WEIGHT: 130 LBS | OXYGEN SATURATION: 97 % | HEART RATE: 69 BPM | TEMPERATURE: 96.9 F | BODY MASS INDEX: 22.2 KG/M2 | DIASTOLIC BLOOD PRESSURE: 60 MMHG

## 2022-04-26 DIAGNOSIS — M50.30 DEGENERATIVE DISC DISEASE, CERVICAL: Primary | ICD-10-CM

## 2022-04-26 PROCEDURE — 99213 OFFICE O/P EST LOW 20 MIN: CPT | Performed by: NURSE PRACTITIONER

## 2022-04-26 RX ORDER — TIZANIDINE HYDROCHLORIDE 4 MG/1
4 CAPSULE, GELATIN COATED ORAL 3 TIMES DAILY PRN
Qty: 30 CAPSULE | Refills: 0 | Status: SHIPPED | OUTPATIENT
Start: 2022-04-26 | End: 2022-07-06 | Stop reason: SDUPTHER

## 2022-04-26 RX ORDER — METHYLPREDNISOLONE 4 MG/1
TABLET ORAL
Qty: 21 TABLET | Refills: 0 | Status: SHIPPED | OUTPATIENT
Start: 2022-04-26 | End: 2022-07-06 | Stop reason: SDUPTHER

## 2022-06-01 DIAGNOSIS — R49.0 DYSPHONIA: ICD-10-CM

## 2022-06-01 RX ORDER — PRIMIDONE 50 MG/1
TABLET ORAL
Qty: 180 TABLET | Refills: 3 | Status: SHIPPED | OUTPATIENT
Start: 2022-06-01

## 2022-06-10 DIAGNOSIS — F41.1 GENERALIZED ANXIETY DISORDER: ICD-10-CM

## 2022-06-10 RX ORDER — ESCITALOPRAM OXALATE 5 MG/1
TABLET ORAL
Qty: 90 TABLET | Refills: 3 | Status: SHIPPED | OUTPATIENT
Start: 2022-06-10 | End: 2022-10-05 | Stop reason: SDUPTHER

## 2022-06-14 ENCOUNTER — ON CAMPUS - OUTPATIENT (AMBULATORY)
Dept: URBAN - METROPOLITAN AREA HOSPITAL 2 | Facility: HOSPITAL | Age: 67
End: 2022-06-14
Payer: MEDICARE

## 2022-06-14 VITALS
HEIGHT: 64 IN | TEMPERATURE: 97.3 F | DIASTOLIC BLOOD PRESSURE: 75 MMHG | SYSTOLIC BLOOD PRESSURE: 83 MMHG | SYSTOLIC BLOOD PRESSURE: 122 MMHG | HEART RATE: 74 BPM | DIASTOLIC BLOOD PRESSURE: 91 MMHG | HEART RATE: 77 BPM | RESPIRATION RATE: 17 BRPM | OXYGEN SATURATION: 100 % | HEART RATE: 79 BPM | DIASTOLIC BLOOD PRESSURE: 71 MMHG | OXYGEN SATURATION: 98 % | HEART RATE: 75 BPM | SYSTOLIC BLOOD PRESSURE: 158 MMHG | DIASTOLIC BLOOD PRESSURE: 54 MMHG | OXYGEN SATURATION: 99 % | WEIGHT: 125 LBS | DIASTOLIC BLOOD PRESSURE: 38 MMHG | HEART RATE: 70 BPM | SYSTOLIC BLOOD PRESSURE: 88 MMHG | DIASTOLIC BLOOD PRESSURE: 92 MMHG | HEART RATE: 68 BPM | HEART RATE: 71 BPM | SYSTOLIC BLOOD PRESSURE: 85 MMHG | SYSTOLIC BLOOD PRESSURE: 145 MMHG | OXYGEN SATURATION: 97 % | DIASTOLIC BLOOD PRESSURE: 73 MMHG | SYSTOLIC BLOOD PRESSURE: 126 MMHG | SYSTOLIC BLOOD PRESSURE: 128 MMHG | OXYGEN SATURATION: 95 % | DIASTOLIC BLOOD PRESSURE: 56 MMHG | SYSTOLIC BLOOD PRESSURE: 125 MMHG | DIASTOLIC BLOOD PRESSURE: 49 MMHG | RESPIRATION RATE: 16 BRPM

## 2022-06-14 DIAGNOSIS — K64.1 SECOND DEGREE HEMORRHOIDS: ICD-10-CM

## 2022-06-14 DIAGNOSIS — Z86.010 PERSONAL HISTORY OF COLONIC POLYPS: ICD-10-CM

## 2022-06-14 DIAGNOSIS — R13.10 DYSPHAGIA, UNSPECIFIED: ICD-10-CM

## 2022-06-14 PROCEDURE — G0105 COLORECTAL SCRN; HI RISK IND: HCPCS | Performed by: INTERNAL MEDICINE

## 2022-06-14 PROCEDURE — 43450 DILATE ESOPHAGUS 1/MULT PASS: CPT | Performed by: INTERNAL MEDICINE

## 2022-06-14 PROCEDURE — 43235 EGD DIAGNOSTIC BRUSH WASH: CPT | Performed by: INTERNAL MEDICINE

## 2022-07-06 ENCOUNTER — TELEPHONE (OUTPATIENT)
Dept: FAMILY MEDICINE CLINIC | Facility: CLINIC | Age: 67
End: 2022-07-06

## 2022-07-06 DIAGNOSIS — M50.30 DEGENERATIVE DISC DISEASE, CERVICAL: ICD-10-CM

## 2022-07-06 RX ORDER — TIZANIDINE HYDROCHLORIDE 4 MG/1
4 CAPSULE, GELATIN COATED ORAL 3 TIMES DAILY PRN
Qty: 30 CAPSULE | Refills: 0 | Status: SHIPPED | OUTPATIENT
Start: 2022-07-06 | End: 2022-08-19

## 2022-07-06 RX ORDER — METHYLPREDNISOLONE 4 MG/1
TABLET ORAL
Qty: 21 TABLET | Refills: 0 | Status: SHIPPED | OUTPATIENT
Start: 2022-07-06 | End: 2022-08-19

## 2022-07-06 NOTE — TELEPHONE ENCOUNTER
I have ordered steroid and muscle relaxer. May need PT/imaging/appointment if not improving w/ meds

## 2022-07-08 DIAGNOSIS — M54.12 CERVICAL RADICULOPATHY: Primary | ICD-10-CM

## 2022-08-01 ENCOUNTER — TREATMENT (OUTPATIENT)
Dept: PHYSICAL THERAPY | Facility: CLINIC | Age: 67
End: 2022-08-01

## 2022-08-01 DIAGNOSIS — M54.12 RADICULOPATHY, CERVICAL: Primary | ICD-10-CM

## 2022-08-01 PROCEDURE — 97110 THERAPEUTIC EXERCISES: CPT | Performed by: PHYSICAL THERAPIST

## 2022-08-01 PROCEDURE — G0283 ELEC STIM OTHER THAN WOUND: HCPCS | Performed by: PHYSICAL THERAPIST

## 2022-08-01 PROCEDURE — 97140 MANUAL THERAPY 1/> REGIONS: CPT | Performed by: PHYSICAL THERAPIST

## 2022-08-01 PROCEDURE — 97161 PT EVAL LOW COMPLEX 20 MIN: CPT | Performed by: PHYSICAL THERAPIST

## 2022-08-01 NOTE — PROGRESS NOTES
Physical Therapy Initial Evaluation and Plan of Care    Patient: Mehnaz Raines   : 1955  Diagnosis/ICD-10 Code:  Radiculopathy, cervical [M54.12]  Referring practitioner: Abhishek Maynard MD  Date of Initial Visit: 2022  Today's Date: 2022  Patient seen for 1 sessions             Subjective Evaluation    History of Present Illness  Mechanism of injury: Patient is a 66 y.o. WF who presents with acute exacerbation of cervical radiculopathy which started in May/Bess.  Her 96 y.o. father in law moved in with them last November and she has been sleeping on the couch (sidelying) since then to be able to hear both her  and his father if they need her during the night.  Two rounds of steroids and muscle relaxers has calmed her symptoms.  Pain rated at 1/10 now, improved from 10/10 initially.  Pain decreases with medication or lying supine without pillow.  Pain increases with lifting.      Patient Goals  Patient goals for therapy: decreased pain and increased motion             Objective NDI score indicates 36% impairment with limitations in headaches, reading, lifting.  Cervical AROM: flexion WNL, extension lacks 25% with pain, SBR and rotation R lack 25% compared to L and are painful.  Muscle guarding B upper trap and subocc mm.  MMT:  4/5 to 5/5 B UEs.  Forward head, protracted shoulders B.  Cervical distraction relieves pain.  Upper trap stretch doesn't reproduce radicular symptoms.        Assessment & Plan     Assessment  Impairments: abnormal or restricted ROM, activity intolerance, impaired physical strength, lacks appropriate home exercise program and pain with function  Functional Limitations: lifting, sleeping and uncomfortable because of pain  Goals  Plan Goals: Patient independent with HEP in 2 weeks  Tolerate 10 min Nustep in 2 weeks  Decrease arm numbness at night 25% in 2 weeks  Improve function as evidenced by NDI score of 20% or less in 12 weeks (36% impairment  initially)  Able to drive without arm numbness in 12 weeks  Able to manage symptoms with HEP, home traction, inversion table, etc. in 12 weeks       Plan  Therapy options: will be seen for skilled therapy services  Planned modality interventions: traction, electrical stimulation/Russian stimulation and thermotherapy (hydrocollator packs)  Other planned modality interventions: physical modalities as needed  Planned therapy interventions: manual therapy, flexibility, functional ROM exercises, home exercise program, neuromuscular re-education, postural training, strengthening, stretching and therapeutic activities  Frequency: 2x week  Duration in weeks: 12  Treatment plan discussed with: patient        Timed:         Manual Therapy:    15     mins  14150;     Therapeutic Exercise:    15     mins  12376;       Un-Timed:  Electrical Stimulation:    15     mins  26635 ( );  Low Eval     15     Mins  60140      Timed Treatment:   30   mins   Total Treatment:     60   mins    PT SIGNATURE: Robin A Sprigler, PT   [place] license # [id number]  Electronically signed by Robin A Sprigler, PT, 08/01/22, 5:22 PM EDT    Initial Certification  Certification Period: 8/1/2022 through 10/29/2022  I certify that the therapy services are furnished while this patient is under my care.  The services outlined above are required by this patient, and will be reviewed every 90 days.     PHYSICIAN: Abhishek Maynard MD ______________________________________________________________________________________________     DATE: _________________________________________________________________________________________________________________________________    Please sign and return via fax to [phone]. Thank you, [facility].

## 2022-08-04 ENCOUNTER — TREATMENT (OUTPATIENT)
Dept: PHYSICAL THERAPY | Facility: CLINIC | Age: 67
End: 2022-08-04

## 2022-08-04 DIAGNOSIS — M54.12 RADICULOPATHY, CERVICAL: Primary | ICD-10-CM

## 2022-08-04 PROCEDURE — G0283 ELEC STIM OTHER THAN WOUND: HCPCS | Performed by: PHYSICAL THERAPIST

## 2022-08-04 PROCEDURE — 97140 MANUAL THERAPY 1/> REGIONS: CPT | Performed by: PHYSICAL THERAPIST

## 2022-08-04 PROCEDURE — 97110 THERAPEUTIC EXERCISES: CPT | Performed by: PHYSICAL THERAPIST

## 2022-08-04 NOTE — PROGRESS NOTES
Physical Therapy Daily Progress Note    Patient: Mehnaz Raines   : 1955  Diagnosis/ICD-10 Code:  Radiculopathy, cervical [M54.12]  Referring practitioner: Abhishek Maynard MD  Date of Initial Visit: Type: THERAPY  Noted: 2022  Today's Date: 2022  Patient seen for 2 sessions             Subjective Patient reports significant relief with manual distraction, suboccipital release and UT stretch.  She verbalizes that exercise is her stress relief and she misses [employer] exercise class.  Discussed taking her father in law with her.  He has macular degeneration.    Objective   See Exercise, Manual, and Modality Logs for complete treatment. Discussed strategies for raising surface of counter for medication management/organization and wearing cervical collar to support neck while performing task.  Added postural education with posture on wall with UE elevation, supine chin tuck, head lift, upper trap stretch.        Assessment/Plan  Patient independent with HEP in 2 weeks - NOT MET  Tolerate 10 min Nustep in 2 weeks - NOT MET  Decrease arm numbness at night 25% in 2 weeks - NOT MET  Improve function as evidenced by NDI score of 20% or less in 12 weeks (36% impairment initially) - NOT MET  Able to drive without arm numbness in 12 weeks - NOT MET  Able to manage symptoms with HEP, home traction, inversion table, etc. in 12 weeks  - NOT MET     Progress per Plan of Care expiring 10/21/22         Timed:         Manual Therapy:    15     mins  16234;     Therapeutic Exercise:    15     mins  35364;       Un-Timed:  Electrical Stimulation:    15     mins  40711 ( );    Timed Treatment:   30   mins   Total Treatment:     45   mins    Robin A Sprigler, PT  Physical Therapist

## 2022-08-08 ENCOUNTER — TREATMENT (OUTPATIENT)
Dept: PHYSICAL THERAPY | Facility: CLINIC | Age: 67
End: 2022-08-08

## 2022-08-08 DIAGNOSIS — M54.12 RADICULOPATHY, CERVICAL: Primary | ICD-10-CM

## 2022-08-08 PROCEDURE — 97140 MANUAL THERAPY 1/> REGIONS: CPT | Performed by: PHYSICAL THERAPIST

## 2022-08-08 PROCEDURE — 97110 THERAPEUTIC EXERCISES: CPT | Performed by: PHYSICAL THERAPIST

## 2022-08-08 PROCEDURE — G0283 ELEC STIM OTHER THAN WOUND: HCPCS | Performed by: PHYSICAL THERAPIST

## 2022-08-08 NOTE — PROGRESS NOTES
Physical Therapy Daily Progress Note    Patient: Mehnaz Raines   : 1955  Diagnosis/ICD-10 Code:  Radiculopathy, cervical [M54.12]  Referring practitioner: Abhishek Maynard MD  Date of Initial Visit: Type: THERAPY  Noted: 2022  Today's Date: 2022  Patient seen for 3 sessions             Subjective Patient reports she is doing her exercises.  Her stress level is up today.      Objective   See Exercise, Manual, and Modality Logs for complete treatment. Discussed potential TDN for cervical MM, plan FREE TRIAL NEXT VISIT.  Attempted mechanical traction today with instant headache, unable to tolerate, discontinued.  Added Nustep, upper row, lat pull and prone extension with good initial response.  Patient had difficulty relaxing cervical paraspinals and suboccipitals today.  Discomfort with MH, try using pillow to cushion NEXT.      Assessment/Plan  Patient independent with HEP in 2 weeks - NOT MET  Tolerate 10 min Nustep in 2 weeks - NOT MET  Decrease arm numbness at night 25% in 2 weeks - NOT MET  Improve function as evidenced by NDI score of 20% or less in 12 weeks (36% impairment initially) - NOT MET  Able to drive without arm numbness in 12 weeks - NOT MET  Able to manage symptoms with HEP, home traction, inversion table, etc. in 12 weeks  - NOT MET     Progress per Plan of Care expiring 10/21/22           Timed:         Manual Therapy:    15     mins  10863;     Therapeutic Exercise:    15     mins  12091;       Un-Timed:  Electrical Stimulation:    15     mins  53498 ( );  Traction     5     mins 22417 NO CHARGE    Timed Treatment:   30   mins   Total Treatment:     50   mins    Robin A Sprigler, PT  Physical Therapist

## 2022-08-11 ENCOUNTER — TREATMENT (OUTPATIENT)
Dept: PHYSICAL THERAPY | Facility: CLINIC | Age: 67
End: 2022-08-11

## 2022-08-11 DIAGNOSIS — M54.12 RADICULOPATHY, CERVICAL: Primary | ICD-10-CM

## 2022-08-11 PROCEDURE — 97140 MANUAL THERAPY 1/> REGIONS: CPT | Performed by: PHYSICAL THERAPIST

## 2022-08-11 PROCEDURE — G0283 ELEC STIM OTHER THAN WOUND: HCPCS | Performed by: PHYSICAL THERAPIST

## 2022-08-11 PROCEDURE — 97110 THERAPEUTIC EXERCISES: CPT | Performed by: PHYSICAL THERAPIST

## 2022-08-11 NOTE — PROGRESS NOTES
Physical Therapy Daily Progress Note    Patient: Mehnaz Raines   : 1955  Diagnosis/ICD-10 Code:  Radiculopathy, cervical [M54.12]  Referring practitioner: Abhishek Maynard MD  Date of Initial Visit: Type: THERAPY  Noted: 2022  Today's Date: 2022  Patient seen for 4 sessions             Subjective Patient woke up with NT R fingertips, still NT at 1 pm in the afternoon.  She frequently wakes up with wrists in extreme extension when she notices NT.      Objective   See Exercise, Manual, and Modality Logs for complete treatment. Free trial of TDN today with good initial response.  No skin irritation noted.  Discussed using wrist splints at night to prevent NT.  Reviewed new HEP from last visit with minimal VC needed for alternating rotation.  Assess response to TDN next visit.  Patient to bring in home cervical traction unit to try/set up.      Assessment/Plan  Patient independent with HEP in 2 weeks - PARTIALLY MET  Tolerate 10 min Nustep in 2 weeks - NOT MET  Decrease arm numbness at night 25% in 2 weeks - NOT MET  Improve function as evidenced by NDI score of 20% or less in 12 weeks (36% impairment initially) - NOT MET  Able to drive without arm numbness in 12 weeks - NOT MET  Able to manage symptoms with HEP, home traction, inversion table, etc. in 12 weeks  - NOT MET     Progress per Plan of Care expiring 10/21/22           Timed:         Manual Therapy:    15     mins  50527;     Therapeutic Exercise:    15     mins  64447;       Un-Timed:  Electrical Stimulation:    15     mins  35768 ( );  Dry Needling     15     mins FREE TRIAL NO CHARGE    Timed Treatment:   30   mins   Total Treatment:     60   mins    Robin A Sprigler, PT  Physical Therapist

## 2022-08-12 DIAGNOSIS — R20.0 NUMBNESS: Primary | ICD-10-CM

## 2022-08-17 ENCOUNTER — TREATMENT (OUTPATIENT)
Dept: PHYSICAL THERAPY | Facility: CLINIC | Age: 67
End: 2022-08-17

## 2022-08-17 DIAGNOSIS — M54.12 RADICULOPATHY, CERVICAL: Primary | ICD-10-CM

## 2022-08-17 PROCEDURE — 97140 MANUAL THERAPY 1/> REGIONS: CPT | Performed by: PHYSICAL THERAPIST

## 2022-08-17 PROCEDURE — 97110 THERAPEUTIC EXERCISES: CPT | Performed by: PHYSICAL THERAPIST

## 2022-08-17 PROCEDURE — G0283 ELEC STIM OTHER THAN WOUND: HCPCS | Performed by: PHYSICAL THERAPIST

## 2022-08-17 NOTE — PROGRESS NOTES
Physical Therapy Daily Progress Note      Patient: Mehnaz Raines   : 1955  Diagnosis/ICD-10 Code:  Radiculopathy, cervical [M54.12]  Referring practitioner: Abhishek Maynard MD  Date of Initial Visit: Type: THERAPY  Noted: 2022  Today's Date: 2022  Patient seen for 5 sessions         Mehnaz Raines reports: her neck is flared up today and initially cannot remember anything they may have caused this and later ins the session reports she had done a lot of pill packaging and had forgotten the technique recommended by PT and believes this is the primary cause of today's flare up in soreness. Pt. Reports she has been able to return to her bed half way through her sleep and has adjusted sleeping needs to aid in recovering from flare up. Pt. Reports she did not care for TDN and would prefer not to do it again in the future.    Objective   See Exercise, Manual, and Modality Logs for complete treatment.     Assessment/Plan  Pt. Tolerates treatment well this visit and responds well to manual intervention as well as Estim and heat. Pt. Completes exercises without pain. Pt. Does present with natural tension in B UT's but does well to maintain otherwise decent posture without cueing. Pt. Will continue to benefit from stretching for tension relief as well returning to better sleeping position.     Patient independent with HEP in 2 weeks - PARTIALLY MET  Tolerate 10 min Nustep in 2 weeks - NOT MET  Decrease arm numbness at night 25% in 2 weeks - NOT MET  Improve function as evidenced by NDI score of 20% or less in 12 weeks (36% impairment initially) - NOT MET  Able to drive without arm numbness in 12 weeks - NOT MET  Able to manage symptoms with HEP, home traction, inversion table, etc. in 12 weeks  - NOT MET     Progress strengthening /stabilization /functional activity           Timed:         Manual Therapy:    15     mins  83130;     Therapeutic Exercise:    15     mins  71229;        Un-Timed:  Electrical Stimulation:    15     mins  89650 ( );        Timed Treatment:   30   mins   Total Treatment:     45   mins    Kinza Muniz PTA  Physical Therapist Assistant License #[id number]

## 2022-08-19 ENCOUNTER — OFFICE VISIT (OUTPATIENT)
Dept: FAMILY MEDICINE CLINIC | Facility: CLINIC | Age: 67
End: 2022-08-19

## 2022-08-19 VITALS
HEIGHT: 64 IN | TEMPERATURE: 97.3 F | WEIGHT: 130 LBS | BODY MASS INDEX: 22.2 KG/M2 | RESPIRATION RATE: 15 BRPM | DIASTOLIC BLOOD PRESSURE: 58 MMHG | OXYGEN SATURATION: 99 % | HEART RATE: 70 BPM | SYSTOLIC BLOOD PRESSURE: 120 MMHG

## 2022-08-19 DIAGNOSIS — M54.12 CERVICAL RADICULOPATHY: Primary | ICD-10-CM

## 2022-08-19 PROCEDURE — 99213 OFFICE O/P EST LOW 20 MIN: CPT | Performed by: FAMILY MEDICINE

## 2022-08-19 RX ORDER — MELOXICAM 7.5 MG/1
7.5 TABLET ORAL DAILY
Qty: 30 TABLET | Refills: 5 | Status: SHIPPED | OUTPATIENT
Start: 2022-08-19 | End: 2022-09-23 | Stop reason: ALTCHOICE

## 2022-08-19 RX ORDER — GABAPENTIN 300 MG/1
300 CAPSULE ORAL NIGHTLY
Qty: 30 CAPSULE | Refills: 2 | Status: SHIPPED | OUTPATIENT
Start: 2022-08-19 | End: 2022-09-23 | Stop reason: ALTCHOICE

## 2022-08-19 RX ORDER — CYCLOBENZAPRINE HCL 10 MG
10 TABLET ORAL 3 TIMES DAILY PRN
Qty: 50 TABLET | Refills: 2 | Status: SHIPPED | OUTPATIENT
Start: 2022-08-19 | End: 2022-09-23 | Stop reason: ALTCHOICE

## 2022-08-19 RX ORDER — PREDNISONE 20 MG/1
TABLET ORAL
Qty: 15 TABLET | Refills: 0 | Status: SHIPPED | OUTPATIENT
Start: 2022-08-19 | End: 2022-09-23 | Stop reason: ALTCHOICE

## 2022-08-19 NOTE — PROGRESS NOTES
Chief Complaint   Patient presents with   • Neck Pain     Numbness in fingers     HPI  Mehnaz Raines is a 66 y.o. female that presents for   Chief Complaint   Patient presents with   • Neck Pain     Numbness in fingers     Neck pain: patient reports neck pain for the last 4 months. Pain is bilateral, R>L. Pain is from base of skull to bilateral shoulders. Pain continues to be progressive. She also reports numbness to R 3rd and 4th digits. Gets mild relief from carpal tunnel brace. Reports dropping items and trouble opening things. 9/2020 XR w/ R neural foraminal stenosis at C3-4 and C4-5. She has completed 2 rounds of steroids w/ muscle relaxer and PT w/ minimal relief.     Review of Systems   Musculoskeletal: Positive for neck pain and neck stiffness.   Neurological: Positive for weakness and numbness.     The following portions of the patient's history were reviewed and updated as appropriate: problem list, past medical history, past surgical history, allergies, current medication    Problem List Tab  Patient History Tab  Immunizations Tab  Medications Tab  Chart Review Tab  Care Everywhere Tab  Synopsis Tab    PE  Vitals:    08/19/22 1438   BP: 120/58   Pulse: 70   Resp: 15   Temp: 97.3 °F (36.3 °C)   SpO2: 99%     Body mass index is 22.31 kg/m².  General: Well nourished, NAD  Head: AT/NC  Eyes: EOMI, anicteric sclera  Neck: Supple. +Spurling  Resp: CTAB, SCR, BS equal  CV: RRR w/o m/r/g; 2+ pulses  GI: Soft, NT/ND, +BS  MSK: FROM, no deformity, no edema  Skin: Warm, dry, intact  Neuro: Alert and oriented. Negative Phalen  Psych: Appropriate mood and affect    Imaging  No Images in the past 120 days found..    Assessment & Plan   Mehnaz Raines is a 66 y.o. female that presents for   Chief Complaint   Patient presents with   • Neck Pain     Numbness in fingers     Diagnoses and all orders for this visit:    1. Cervical radiculopathy (Primary): Ongoing for months and progressive.  Not responding to physical  therapy.  Plain films from 2020 with degenerative change  -     MRI Cervical Spine Without Contrast; Future  -     predniSONE (DELTASONE) 20 MG tablet; Take 2 tablets daily for 5 days and then 1 tablet daily for 5 days  Dispense: 15 tablet; Refill: 0  -     cyclobenzaprine (FLEXERIL) 10 MG tablet; Take 1 tablet by mouth 3 (Three) Times a Day As Needed for Muscle Spasms.  Dispense: 50 tablet; Refill: 2  -     meloxicam (Mobic) 7.5 MG tablet; Take 1 tablet by mouth Daily.  Dispense: 30 tablet; Refill: 5  -     gabapentin (NEURONTIN) 300 MG capsule; Take 1 capsule by mouth Every Night.  Dispense: 30 capsule; Refill: 2  - Plan for neurosurgery versus pain management referral pending MRI     Return if symptoms worsen or fail to improve.

## 2022-08-25 ENCOUNTER — TREATMENT (OUTPATIENT)
Dept: PHYSICAL THERAPY | Facility: CLINIC | Age: 67
End: 2022-08-25

## 2022-08-25 DIAGNOSIS — M54.12 RADICULOPATHY, CERVICAL: Primary | ICD-10-CM

## 2022-08-25 PROCEDURE — 97140 MANUAL THERAPY 1/> REGIONS: CPT | Performed by: PHYSICAL THERAPIST

## 2022-08-25 PROCEDURE — G0283 ELEC STIM OTHER THAN WOUND: HCPCS | Performed by: PHYSICAL THERAPIST

## 2022-08-25 PROCEDURE — 97535 SELF CARE MNGMENT TRAINING: CPT | Performed by: PHYSICAL THERAPIST

## 2022-08-25 PROCEDURE — 97110 THERAPEUTIC EXERCISES: CPT | Performed by: PHYSICAL THERAPIST

## 2022-08-25 NOTE — PROGRESS NOTES
Physical Therapy Daily Progress Note    Patient: Mehnaz Raines   : 1955  Diagnosis/ICD-10 Code:  Radiculopathy, cervical [M54.12]  Referring practitioner: Abhishek Maynard MD  Date of Initial Visit: Type: THERAPY  Noted: 2022  Today's Date: 2022  Patient seen for 6 sessions             Subjective Patient reports she saw her MD and has an MRI ordered for September.  She has started Gabapentin and flexeril, still taking steroids.  She has started sleeping more in her bed and that is helping neck pain.  Prone exercise still difficult due to rotation component.  Patient brought in her sister's TENS unit for set up and instruction.    Objective   See Exercise, Manual, and Modality Logs for complete treatment. Instructed patient in home ES unit set up for IFC 10/10.  Reviewed and updated HEP with open book, supine overhead flexion with 5# weight, and contract relax cervical rotation.  Posture on wall progressing, still forward head in standing.  Able to tolerate 10 min on Nustep.      Assessment/Plan  Patient independent with HEP in 2 weeks - PARTIALLY MET  Tolerate 10 min Nustep in 2 weeks - MET  Decrease arm numbness at night 25% in 2 weeks - NOT MET  Improve function as evidenced by NDI score of 20% or less in 12 weeks (36% impairment initially) - NOT MET  Able to drive without arm numbness in 12 weeks - NOT MET  Able to manage symptoms with HEP, home traction, inversion table, etc. in 12 weeks  - NOT MET     Progress per Plan of Care expiring 10/21/22, asked patient to bring home cervical traction unit next visit for set up           Timed:         Manual Therapy:    15     mins  13150;     Therapeutic Exercise:    15     mins  60725;     Self Care                       15     mins   65979      Un-Timed:  Electrical Stimulation:    15     mins  34898 ( );    Timed Treatment:   45   mins   Total Treatment:     60   mins    Robin A Sprigler, PT  Physical Therapist

## 2022-08-29 ENCOUNTER — TREATMENT (OUTPATIENT)
Dept: PHYSICAL THERAPY | Facility: CLINIC | Age: 67
End: 2022-08-29

## 2022-08-29 DIAGNOSIS — M54.12 RADICULOPATHY, CERVICAL: Primary | ICD-10-CM

## 2022-08-29 PROCEDURE — 97535 SELF CARE MNGMENT TRAINING: CPT | Performed by: PHYSICAL THERAPIST

## 2022-08-29 PROCEDURE — 97140 MANUAL THERAPY 1/> REGIONS: CPT | Performed by: PHYSICAL THERAPIST

## 2022-08-29 PROCEDURE — G0283 ELEC STIM OTHER THAN WOUND: HCPCS | Performed by: PHYSICAL THERAPIST

## 2022-08-29 PROCEDURE — 97110 THERAPEUTIC EXERCISES: CPT | Performed by: PHYSICAL THERAPIST

## 2022-08-29 PROCEDURE — 97012 MECHANICAL TRACTION THERAPY: CPT | Performed by: PHYSICAL THERAPIST

## 2022-08-29 NOTE — PROGRESS NOTES
Physical Therapy Daily Progress Note    Patient: Mehnaz Raines   : 1955  Diagnosis/ICD-10 Code:  Radiculopathy, cervical [M54.12]  Referring practitioner: Abhishek Maynard MD  Date of Initial Visit: Type: THERAPY  Noted: 2022  Today's Date: 2022  Patient seen for 7 sessions             Subjective Patient reports she is feeling a little better.  She brought a home pneumatic traction unit to set up today.  She has been using home TENS without difficulty.  Numbness and tingling is constant during the day but not bothering her at night.    Objective   See Exercise, Manual, and Modality Logs for complete treatment. Instructed patient in home pneumatic traction unit to set up today to 10 pounds of pressure.  Instructed patient to try at least once per day for the next week, increasing frequency if tolerated.  R CPSM and subocc mm tender to palpation.  Reassess NDI NEXT.  Assess response to traction.  Able to hit all contact points with posture on wall.  Discussed energy conservation with medication management.      Assessment/Plan  Patient independent with HEP in 2 weeks - MET  Tolerate 10 min Nustep in 2 weeks - MET  Decrease arm numbness at night 25% in 2 weeks -  MET  Improve function as evidenced by NDI score of 20% or less in 12 weeks (36% impairment initially) - NOT MET  Able to drive without arm numbness in 12 weeks - NOT MET  Able to manage symptoms with HEP, home traction, inversion table, etc. in 12 weeks  - PARTIALLY MET     Progress per Plan of Care expiring 10/21/22           Timed:         Manual Therapy:    15     mins  02428;     Therapeutic Exercise:    15     mins  98482;     Self Care                       10     mins   80299      Un-Timed:  Electrical Stimulation:    15     mins  79809 ( );  Traction      5 min [id number]    Timed Treatment:   45   mins   Total Treatment:     65   mins    Robin A Sprigler, PT  Physical Therapist

## 2022-09-01 ENCOUNTER — TREATMENT (OUTPATIENT)
Dept: PHYSICAL THERAPY | Facility: CLINIC | Age: 67
End: 2022-09-01

## 2022-09-01 DIAGNOSIS — M54.12 RADICULOPATHY, CERVICAL: Primary | ICD-10-CM

## 2022-09-01 PROCEDURE — G0283 ELEC STIM OTHER THAN WOUND: HCPCS | Performed by: PHYSICAL THERAPIST

## 2022-09-01 PROCEDURE — 97140 MANUAL THERAPY 1/> REGIONS: CPT | Performed by: PHYSICAL THERAPIST

## 2022-09-01 PROCEDURE — 97110 THERAPEUTIC EXERCISES: CPT | Performed by: PHYSICAL THERAPIST

## 2022-09-01 NOTE — PROGRESS NOTES
Physical Therapy Daily Progress Note    Patient: Mehnaz Raines   : 1955  Diagnosis/ICD-10 Code:  Radiculopathy, cervical [M54.12]  Referring practitioner: Abhishek Maynard MD  Date of Initial Visit: Type: THERAPY  Noted: 2022  Today's Date: 2022  Patient seen for 8 sessions             Subjective Patient reports her neck is feeling better, no changes yet in NT.  She is using home traction 10 min at 10-12# every other day.  Discussed increasing to 15 min per day.    Objective   See Exercise, Manual, and Modality Logs for complete treatment. NDI indicates only 14% impairment, improved from 36% initially.  Lifting is the main remaining limitation. Patient is pleased with progress.  Patient has achieved 4 of 6 goals set at initial evaluation.    Assessment/Plan  Patient independent with HEP in 2 weeks - MET  Tolerate 10 min Nustep in 2 weeks - MET  Decrease arm numbness at night 25% in 2 weeks -  MET  Improve function as evidenced by NDI score of 20% or less in 12 weeks (36% impairment initially) - MET, 14% on 22  Able to drive without arm numbness in 12 weeks - NOT MET  Able to manage symptoms with HEP, home traction, inversion table, etc. in 12 weeks  - PARTIALLY MET     Progress per Plan of Care expiring 10/21/22           Timed:         Manual Therapy:    15     mins  85394;     Therapeutic Exercise:    15     mins  34986;       Un-Timed:  Electrical Stimulation:    15     mins  83844 ( );    Timed Treatment:   30   mins   Total Treatment:     45   mins    Robin A Sprigler, PT  Physical Therapist

## 2022-09-07 ENCOUNTER — TRANSCRIBE ORDERS (OUTPATIENT)
Dept: ADMINISTRATIVE | Facility: HOSPITAL | Age: 67
End: 2022-09-07

## 2022-09-07 DIAGNOSIS — Z12.31 VISIT FOR SCREENING MAMMOGRAM: Primary | ICD-10-CM

## 2022-09-08 ENCOUNTER — TREATMENT (OUTPATIENT)
Dept: PHYSICAL THERAPY | Facility: CLINIC | Age: 67
End: 2022-09-08

## 2022-09-08 DIAGNOSIS — M54.12 RADICULOPATHY, CERVICAL: Primary | ICD-10-CM

## 2022-09-08 PROCEDURE — 97110 THERAPEUTIC EXERCISES: CPT | Performed by: PHYSICAL THERAPIST

## 2022-09-08 PROCEDURE — 97140 MANUAL THERAPY 1/> REGIONS: CPT | Performed by: PHYSICAL THERAPIST

## 2022-09-08 PROCEDURE — G0283 ELEC STIM OTHER THAN WOUND: HCPCS | Performed by: PHYSICAL THERAPIST

## 2022-09-08 NOTE — PROGRESS NOTES
"     Physical Therapy Daily Progress Note    Patient: Mehnaz Raines   : 1955  Diagnosis/ICD-10 Code:  Radiculopathy, cervical [M54.12]  Referring practitioner: Abhishek Maynard MD  Date of Initial Visit: Type: THERAPY  Noted: 2022  Today's Date: 2022  Patient seen for 9 sessions             Subjective Medication management improved with separation of activities and raising work level.  MRI and EMG scheduled before end of Sept.  Patient reports NT a little worse this past week.  She increased traction to 15 min 1 time but was uncomfortable and had a \"pinch in neck\" the next day.  Plans to go back to 10 min duration.    Objective   See Exercise, Manual, and Modality Logs for complete treatment.       Assessment/Plan  Patient independent with HEP in 2 weeks - MET  Tolerate 10 min Nustep in 2 weeks - MET  Decrease arm numbness at night 25% in 2 weeks -  MET  Improve function as evidenced by NDI score of 20% or less in 12 weeks (36% impairment initially) - MET, 14% on 22  Able to drive without arm numbness in 12 weeks - NOT MET  Able to manage symptoms with HEP, home traction, inversion table, etc. in 12 weeks  - PARTIALLY MET     Progress per Plan of Care expiring 10/21/22            Timed:         Manual Therapy:    15     mins  45481;     Therapeutic Exercise:    15     mins  22225;       Un-Timed:  Electrical Stimulation:    15     mins  06869 ( );    Timed Treatment:   30   mins   Total Treatment:     45   mins    Robin A Sprigler, PT  Physical Therapist    "

## 2022-09-12 ENCOUNTER — HOSPITAL ENCOUNTER (OUTPATIENT)
Dept: MAMMOGRAPHY | Facility: HOSPITAL | Age: 67
Discharge: HOME | End: 2022-09-12
Admitting: OBSTETRICS & GYNECOLOGY

## 2022-09-12 ENCOUNTER — TREATMENT (OUTPATIENT)
Dept: PHYSICAL THERAPY | Facility: CLINIC | Age: 67
End: 2022-09-12

## 2022-09-12 DIAGNOSIS — Z12.31 VISIT FOR SCREENING MAMMOGRAM: ICD-10-CM

## 2022-09-12 DIAGNOSIS — M54.12 RADICULOPATHY, CERVICAL: Primary | ICD-10-CM

## 2022-09-12 PROCEDURE — 77067 SCR MAMMO BI INCL CAD: CPT

## 2022-09-12 PROCEDURE — 97140 MANUAL THERAPY 1/> REGIONS: CPT | Performed by: PHYSICAL THERAPIST

## 2022-09-12 PROCEDURE — G0283 ELEC STIM OTHER THAN WOUND: HCPCS | Performed by: PHYSICAL THERAPIST

## 2022-09-12 PROCEDURE — 97110 THERAPEUTIC EXERCISES: CPT | Performed by: PHYSICAL THERAPIST

## 2022-09-12 PROCEDURE — 77063 BREAST TOMOSYNTHESIS BI: CPT

## 2022-09-12 NOTE — PROGRESS NOTES
Physical Therapy Daily Progress Note    Patient: Mehnaz Raines   : 1955  Diagnosis/ICD-10 Code:  Radiculopathy, cervical [M54.12]  Referring practitioner: Abhisehk Maynard MD  Date of Initial Visit: Type: THERAPY  Noted: 2022  Today's Date: 2022  Patient seen for 10 sessions             Subjective Patient reports her neck feels great, her elliptical broke last night, she's using inversion table 10 min daily instead of traction due to comfort.  No change with NT symptoms.  She's having a hard day, an abnormal pap came back as cervical cancer.  [facility] biopsy scheduled for Thursday 9/15.  May have to have hysterectomy. Cancelled 9/15 PT appt.    Objective   See Exercise, Manual, and Modality Logs for complete treatment. Progressed weight to 8 kg on rows/lats.      Assessment/Plan  Patient independent with HEP in 2 weeks - MET  Tolerate 10 min Nustep in 2 weeks - MET  Decrease arm numbness at night 25% in 2 weeks -  MET  Improve function as evidenced by NDI score of 20% or less in 12 weeks (36% impairment initially) - MET, 14% on 22  Able to drive without arm numbness in 12 weeks - NOT MET  Able to manage symptoms with HEP, home traction, inversion table, etc. in 12 weeks  - PARTIALLY MET     Progress per Plan of Care expiring 10/21/22           Timed:         Manual Therapy:    15     mins  27896;     Therapeutic Exercise:    15     mins  68591;       Un-Timed:  Electrical Stimulation:    15     mins  16399 ( );    Timed Treatment:   30   mins   Total Treatment:     45   mins    Robin A Sprigler, PT  Physical Therapist

## 2022-09-13 ENCOUNTER — TRANSCRIBE ORDERS (OUTPATIENT)
Dept: ADMINISTRATIVE | Facility: HOSPITAL | Age: 67
End: 2022-09-13

## 2022-09-13 ENCOUNTER — HOSPITAL ENCOUNTER (OUTPATIENT)
Dept: CARDIOLOGY | Facility: HOSPITAL | Age: 67
Discharge: HOME | End: 2022-09-13

## 2022-09-13 ENCOUNTER — LAB (OUTPATIENT)
Dept: LAB | Facility: HOSPITAL | Age: 67
End: 2022-09-13

## 2022-09-13 DIAGNOSIS — Z01.818 PREOP EXAMINATION: ICD-10-CM

## 2022-09-13 DIAGNOSIS — Z01.818 PREOP EXAMINATION: Primary | ICD-10-CM

## 2022-09-13 LAB
BASOPHILS # BLD AUTO: 0.06 10*3/MM3 (ref 0–0.2)
BASOPHILS NFR BLD AUTO: 1.2 % (ref 0–1.5)
DEPRECATED RDW RBC AUTO: 41.9 FL (ref 37–54)
EOSINOPHIL # BLD AUTO: 0.28 10*3/MM3 (ref 0–0.4)
EOSINOPHIL NFR BLD AUTO: 5.8 % (ref 0.3–6.2)
ERYTHROCYTE [DISTWIDTH] IN BLOOD BY AUTOMATED COUNT: 11.9 % (ref 12.3–15.4)
HCT VFR BLD AUTO: 36.2 % (ref 34–46.6)
HGB BLD-MCNC: 12.6 G/DL (ref 12–15.9)
IMM GRANULOCYTES # BLD AUTO: 0.01 10*3/MM3 (ref 0–0.05)
IMM GRANULOCYTES NFR BLD AUTO: 0.2 % (ref 0–0.5)
LYMPHOCYTES # BLD AUTO: 1.35 10*3/MM3 (ref 0.7–3.1)
LYMPHOCYTES NFR BLD AUTO: 28.1 % (ref 19.6–45.3)
MCH RBC QN AUTO: 32.6 PG (ref 26.6–33)
MCHC RBC AUTO-ENTMCNC: 34.8 G/DL (ref 31.5–35.7)
MCV RBC AUTO: 93.5 FL (ref 79–97)
MONOCYTES # BLD AUTO: 0.54 10*3/MM3 (ref 0.1–0.9)
MONOCYTES NFR BLD AUTO: 11.2 % (ref 5–12)
NEUTROPHILS NFR BLD AUTO: 2.57 10*3/MM3 (ref 1.7–7)
NEUTROPHILS NFR BLD AUTO: 53.5 % (ref 42.7–76)
NRBC BLD AUTO-RTO: 0 /100 WBC (ref 0–0.2)
PLATELET # BLD AUTO: 259 10*3/MM3 (ref 140–450)
PMV BLD AUTO: 9.8 FL (ref 6–12)
RBC # BLD AUTO: 3.87 10*6/MM3 (ref 3.77–5.28)
WBC NRBC COR # BLD: 4.81 10*3/MM3 (ref 3.4–10.8)

## 2022-09-13 PROCEDURE — 93010 ELECTROCARDIOGRAM REPORT: CPT | Performed by: INTERNAL MEDICINE

## 2022-09-13 PROCEDURE — 93005 ELECTROCARDIOGRAM TRACING: CPT | Performed by: OBSTETRICS & GYNECOLOGY

## 2022-09-13 PROCEDURE — 85025 COMPLETE CBC W/AUTO DIFF WBC: CPT

## 2022-09-13 PROCEDURE — 36415 COLL VENOUS BLD VENIPUNCTURE: CPT

## 2022-09-15 ENCOUNTER — LAB REQUISITION (OUTPATIENT)
Dept: LAB | Facility: HOSPITAL | Age: 67
End: 2022-09-15

## 2022-09-15 DIAGNOSIS — B97.7 PAPILLOMAVIRUS AS THE CAUSE OF DISEASES CLASSIFIED ELSEWHERE: ICD-10-CM

## 2022-09-15 PROCEDURE — 88307 TISSUE EXAM BY PATHOLOGIST: CPT | Performed by: OBSTETRICS & GYNECOLOGY

## 2022-09-16 ENCOUNTER — HOSPITAL ENCOUNTER (OUTPATIENT)
Dept: MRI IMAGING | Facility: HOSPITAL | Age: 67
Discharge: HOME | End: 2022-09-16
Admitting: FAMILY MEDICINE

## 2022-09-16 DIAGNOSIS — M54.12 CERVICAL RADICULOPATHY: ICD-10-CM

## 2022-09-16 LAB
LAB AP CASE REPORT: NORMAL
PATH REPORT.FINAL DX SPEC: NORMAL
PATH REPORT.GROSS SPEC: NORMAL

## 2022-09-16 PROCEDURE — 72141 MRI NECK SPINE W/O DYE: CPT

## 2022-09-19 DIAGNOSIS — M48.02 CERVICAL STENOSIS OF SPINAL CANAL: Primary | ICD-10-CM

## 2022-09-19 LAB — QT INTERVAL: 383 MS

## 2022-09-19 NOTE — PROGRESS NOTES
Spoke with Mehnaz and she understands her results revealing moderate to severe spinal stenosis at C3-C4 which is flattening her spinal cord. Giving the findings she understands this is probably cause for her neck pain and Dr. Maynard feels it reasonable to see Neurosurgeon to get an opinion on these findings and that she will be contacted with an appointment.

## 2022-09-22 ENCOUNTER — TREATMENT (OUTPATIENT)
Dept: PHYSICAL THERAPY | Facility: CLINIC | Age: 67
End: 2022-09-22

## 2022-09-22 DIAGNOSIS — M54.12 RADICULOPATHY, CERVICAL: Primary | ICD-10-CM

## 2022-09-22 PROCEDURE — G0283 ELEC STIM OTHER THAN WOUND: HCPCS | Performed by: PHYSICAL THERAPIST

## 2022-09-22 PROCEDURE — 97110 THERAPEUTIC EXERCISES: CPT | Performed by: PHYSICAL THERAPIST

## 2022-09-22 PROCEDURE — 97140 MANUAL THERAPY 1/> REGIONS: CPT | Performed by: PHYSICAL THERAPIST

## 2022-09-22 NOTE — PROGRESS NOTES
Physical Therapy Daily Progress Note    Patient: Mehnaz Raines   : 1955  Diagnosis/ICD-10 Code:  Radiculopathy, cervical [M54.12]  Referring practitioner: Abhishek Maynard MD  Date of Initial Visit: Type: THERAPY  Noted: 2022  Today's Date: 2022  Patient seen for 11 sessions             Subjective Patient reports she is planning for a total hysterectomy to be scheduled.  MRI results show 1. Moderate to severe central canal stenosis at C3-4, Moderate canal   stenosis at C4-5. 2. Varying degrees of cervical neural foraminal stenosis. The findings are most severe on the right at C3-4, bilaterally at C4-5, on the left   at C6-7.  She has an appointment with Miroslava (neurosurgeon) tomorrow.  Had to stop Myloxicam for biopsy so her neck is hurting but she's back on it now.    Objective   See Exercise, Manual, and Modality Logs for complete treatment. Muscle guarding in upper trap and cervical paraspinals increased today with increased pain.      Assessment/Plan  Patient independent with HEP in 2 weeks - MET  Tolerate 10 min Nustep in 2 weeks - MET  Decrease arm numbness at night 25% in 2 weeks -  MET  Improve function as evidenced by NDI score of 20% or less in 12 weeks (36% impairment initially) - MET, 14% on 22  Able to drive without arm numbness in 12 weeks - NOT MET  Able to manage symptoms with HEP, home traction, inversion table, etc. in 12 weeks  - PARTIALLY MET     Progress per Plan of Care expiring 10/21/22           Timed:         Manual Therapy:    15     mins  62731;     Therapeutic Exercise:    15     mins  79778;     Neuromuscular Shaye:        mins  82657;    Therapeutic Activity:          mins  85449;     Gait Training:           mins  52812;     Ultrasound:          mins  93452;    Ionto                                   mins   09453  Self Care                            mins   38264      Un-Timed:  Electrical Stimulation:    15     mins  10818 ( );  Dry Needling           mins self-pay  Traction          mins 74598  Low Eval          Mins  89058  Mod Eval          Mins  81023  High Eval                            Mins  37076  Canalith Repos                   mins  95131    Timed Treatment:   30   mins   Total Treatment:     45   mins    Robin A Sprigler, PT  Physical Therapist

## 2022-09-23 ENCOUNTER — OFFICE VISIT (OUTPATIENT)
Dept: NEUROSURGERY | Facility: CLINIC | Age: 67
End: 2022-09-23

## 2022-09-23 VITALS
OXYGEN SATURATION: 97 % | WEIGHT: 129 LBS | TEMPERATURE: 97.6 F | HEART RATE: 71 BPM | HEIGHT: 64 IN | SYSTOLIC BLOOD PRESSURE: 150 MMHG | BODY MASS INDEX: 22.02 KG/M2 | DIASTOLIC BLOOD PRESSURE: 76 MMHG | RESPIRATION RATE: 18 BRPM

## 2022-09-23 DIAGNOSIS — R20.0 HAND NUMBNESS: ICD-10-CM

## 2022-09-23 DIAGNOSIS — M48.02 CERVICAL STENOSIS OF SPINE: Primary | ICD-10-CM

## 2022-09-23 PROCEDURE — 99203 OFFICE O/P NEW LOW 30 MIN: CPT | Performed by: NEUROLOGICAL SURGERY

## 2022-09-23 NOTE — PROGRESS NOTES
"Subjective   History of Present Illness: Mehnaz Raines is a 66 y.o. female is being seen for consultation today at the request of Abhishek Maynard MD for neck pain with numbness on her right two digits.  Patient says the numbness and tingling has been going on for couple months.  The numbness and tingling is in her third and fourth digits on the right.  Patient denies any pain radiating into her upper extremities.  No other numbness and tingling.  No difficulty using her hands or dropping things.  No weakness no balance issues.    Chief Complaint   Patient presents with   • Neck Pain     Shoulder blade pain          Previous Treatment: Physical therapy, Prednisone, Flexeril, Gabapentin & NSAID's    The following portions of the patient's history were reviewed and updated as appropriate: allergies, current medications, past family history, past medical history, past social history, past surgical history and problem list.    Review of Systems   Constitutional: Positive for activity change.   HENT: Negative.    Eyes: Negative.    Respiratory: Negative for chest tightness and shortness of breath.    Cardiovascular: Negative for chest pain.   Gastrointestinal: Negative.    Genitourinary: Negative.    Musculoskeletal: Positive for myalgias and neck pain.   Skin: Negative.    Allergic/Immunologic: Negative.    Neurological: Positive for weakness and numbness.        Positive for tingling   Hematological: Negative.    Psychiatric/Behavioral: Negative.        Objective     /76   Pulse 71   Temp 97.6 °F (36.4 °C)   Resp 18   Ht 162.6 cm (64\")   Wt 58.5 kg (129 lb)   SpO2 97%   BMI 22.14 kg/m²    Body mass index is 22.14 kg/m².      Neurologic Exam     Mental Status   Oriented to person, place, and time.     Motor Exam     Strength   Strength 5/5 throughout.     Sensory Exam   Decreased sensation over palmar surface of the third and fourth digits on the right     Gait, Coordination, and Reflexes "     Reflexes   Right Nelson: absent  Left Nelson: absent      Assessment & Plan   Independent Review of Radiographic Studies:      I personally reviewed and interpreted the images from the following studies.    MRI cervical spine: Multilevel degenerative changes most severe from C3-5.  There is moderate central stenosis at both levels without overt cord compression.  There is CSF reserve around the spinal cord.  There is foraminal stenosis at these levels as well.  No other high-grade central or foraminal stenosis.    Medical Decision Making:      Mehnaz Raines is a 66 y.o. female with numbness and paresthesias of the third and fourth digits on the right.  Patient has no signs or symptoms of myelopathy.  She does have stenosis on MRI, but does appear to have CSF reserve around the spinal cord.  Surgical intervention is not indicated at this time.  I explained the symptoms of myelopathy to the patient as well as radiculopathy and she will follow-up in clinic should she develop the symptoms.  Patient can continue with physical therapy.  She is scheduled for an EMG nerve conduction study which have encouraged her to complete.  Should this study demonstrate that the symptoms are part of her radiculopathy, she can follow-up.      Diagnoses and all orders for this visit:    1. Cervical stenosis of spine (Primary)    2. Hand numbness      No follow-ups on file.    This patient was examined wearing appropriate personal protective equipment.                      Dr. Brigido Colorado IV    09/23/22  14:50 EDT

## 2022-09-26 ENCOUNTER — TREATMENT (OUTPATIENT)
Dept: PHYSICAL THERAPY | Facility: CLINIC | Age: 67
End: 2022-09-26

## 2022-09-26 DIAGNOSIS — M54.12 RADICULOPATHY, CERVICAL: Primary | ICD-10-CM

## 2022-09-26 PROCEDURE — G0283 ELEC STIM OTHER THAN WOUND: HCPCS | Performed by: PHYSICAL THERAPIST

## 2022-09-26 PROCEDURE — 97110 THERAPEUTIC EXERCISES: CPT | Performed by: PHYSICAL THERAPIST

## 2022-09-26 PROCEDURE — 97140 MANUAL THERAPY 1/> REGIONS: CPT | Performed by: PHYSICAL THERAPIST

## 2022-09-26 NOTE — PROGRESS NOTES
Physical Therapy Daily Progress Note    Patient: Mehnaz Raines   : 1955  Diagnosis/ICD-10 Code:  Radiculopathy, cervical [M54.12]  Referring practitioner: Abhishek Maynard MD  Date of Initial Visit: Type: THERAPY  Noted: 2022  Today's Date: 2022  Patient seen for 12 sessions             Subjective Patient reports Myloxicam is helping with pain.  Her neurologist thinks NT in hand is coming from hand so she has rescheduled her EMG for 11/15/22.      Objective   See Exercise, Manual, and Modality Logs for complete treatment. No problem tolerating treatment.  Lat pull is more challenging than row.        Assessment/Plan  Patient independent with HEP in 2 weeks - MET  Tolerate 10 min Nustep in 2 weeks - MET  Decrease arm numbness at night 25% in 2 weeks -  MET  Improve function as evidenced by NDI score of 20% or less in 12 weeks (36% impairment initially) - MET, 14% on 22  Able to drive without arm numbness in 12 weeks - NOT MET  Able to manage symptoms with HEP, home traction, inversion table, etc. in 12 weeks  - PARTIALLY MET     Progress per Plan of Care expiring 10/21/22           Timed:         Manual Therapy:    15     mins  72981;     Therapeutic Exercise:    15     mins  77090;     Neuromuscular Shaye:        mins  82366;    Therapeutic Activity:          mins  25892;     Gait Training:           mins  42044;     Ultrasound:          mins  03310;    Ionto                                   mins   48971  Self Care                            mins   02807      Un-Timed:  Electrical Stimulation:    15     mins  29549 ( );  Dry Needling          mins self-pay  Traction          mins 65364  Low Eval          Mins  91475  Mod Eval          Mins  59844  High Eval                            Mins  63603  Canalith Repos                   mins  16000    Timed Treatment:   30   mins   Total Treatment:     45   mins    Robin A Sprigler, PT  Physical Therapist

## 2022-09-29 ENCOUNTER — TREATMENT (OUTPATIENT)
Dept: PHYSICAL THERAPY | Facility: CLINIC | Age: 67
End: 2022-09-29

## 2022-09-29 DIAGNOSIS — M54.12 RADICULOPATHY, CERVICAL: Primary | ICD-10-CM

## 2022-09-29 PROCEDURE — 97110 THERAPEUTIC EXERCISES: CPT | Performed by: PHYSICAL THERAPIST

## 2022-09-29 PROCEDURE — G0283 ELEC STIM OTHER THAN WOUND: HCPCS | Performed by: PHYSICAL THERAPIST

## 2022-09-29 PROCEDURE — 97140 MANUAL THERAPY 1/> REGIONS: CPT | Performed by: PHYSICAL THERAPIST

## 2022-09-29 NOTE — PROGRESS NOTES
Physical Therapy Daily Progress Note    Patient: Mehnaz Raines   : 1955  Diagnosis/ICD-10 Code:  Radiculopathy, cervical [M54.12]  Referring practitioner: Abhishek Maynard MD  Date of Initial Visit: Type: THERAPY  Noted: 2022  Today's Date: 2022  Patient seen for 13 sessions             Subjective Patient reports her symptoms come and go.  She would prefer not to have to take Myloxicam.    Objective   See Exercise, Manual, and Modality Logs for complete treatment.       Assessment/Plan  Patient independent with HEP in 2 weeks - MET  Tolerate 10 min Nustep in 2 weeks - MET  Decrease arm numbness at night 25% in 2 weeks -  MET  Improve function as evidenced by NDI score of 20% or less in 12 weeks (36% impairment initially) - MET, 14% on 22  Able to drive without arm numbness in 12 weeks - NOT MET  Able to manage symptoms with HEP, home traction, inversion table, etc. in 12 weeks  - PARTIALLY MET     Progress per Plan of Care expiring 10/21/22           Timed:         Manual Therapy:    15     mins  14789;     Therapeutic Exercise:    15     mins  06461;     Neuromuscular Shaye:        mins  46426;    Therapeutic Activity:          mins  07494;     Gait Training:           mins  38165;     Ultrasound:          mins  12918;    Ionto                                   mins   95461  Self Care                            mins   27267      Un-Timed:  Electrical Stimulation:    15     mins  35366 ( );  Dry Needling          mins self-pay  Traction          mins 91510  Low Eval          Mins  10524  Mod Eval          Mins  48449  High Eval                            Mins  18485  Canalith Repos                   mins  43747    Timed Treatment:   30   mins   Total Treatment:     45   mins    Robin A Sprigler, PT  Physical Therapist

## 2022-10-05 ENCOUNTER — LAB (OUTPATIENT)
Dept: FAMILY MEDICINE CLINIC | Facility: CLINIC | Age: 67
End: 2022-10-05

## 2022-10-05 ENCOUNTER — OFFICE VISIT (OUTPATIENT)
Dept: FAMILY MEDICINE CLINIC | Facility: CLINIC | Age: 67
End: 2022-10-05

## 2022-10-05 VITALS
DIASTOLIC BLOOD PRESSURE: 76 MMHG | SYSTOLIC BLOOD PRESSURE: 120 MMHG | TEMPERATURE: 97.7 F | HEIGHT: 64 IN | WEIGHT: 130.6 LBS | OXYGEN SATURATION: 99 % | BODY MASS INDEX: 22.3 KG/M2 | RESPIRATION RATE: 16 BRPM | HEART RATE: 67 BPM

## 2022-10-05 DIAGNOSIS — G56.03 BILATERAL CARPAL TUNNEL SYNDROME: ICD-10-CM

## 2022-10-05 DIAGNOSIS — N87.9 CERVICAL DYSPLASIA: ICD-10-CM

## 2022-10-05 DIAGNOSIS — E55.9 VITAMIN D DEFICIENCY, UNSPECIFIED: ICD-10-CM

## 2022-10-05 DIAGNOSIS — F41.1 GENERALIZED ANXIETY DISORDER: ICD-10-CM

## 2022-10-05 DIAGNOSIS — Z00.00 MEDICARE ANNUAL WELLNESS VISIT, SUBSEQUENT: Primary | ICD-10-CM

## 2022-10-05 DIAGNOSIS — I10 PRIMARY HYPERTENSION: ICD-10-CM

## 2022-10-05 DIAGNOSIS — R49.0 DYSPHONIA: ICD-10-CM

## 2022-10-05 DIAGNOSIS — M54.12 CERVICAL RADICULOPATHY: ICD-10-CM

## 2022-10-05 LAB
25(OH)D3 SERPL-MCNC: 49.1 NG/ML (ref 30–100)
ALBUMIN SERPL-MCNC: 4.1 G/DL (ref 3.5–5.2)
ALBUMIN/GLOB SERPL: 1.6 G/DL
ALP SERPL-CCNC: 99 U/L (ref 39–117)
ALT SERPL W P-5'-P-CCNC: 11 U/L (ref 1–33)
ANION GAP SERPL CALCULATED.3IONS-SCNC: 9.6 MMOL/L (ref 5–15)
AST SERPL-CCNC: 25 U/L (ref 1–32)
BASOPHILS # BLD AUTO: 0.06 10*3/MM3 (ref 0–0.2)
BASOPHILS NFR BLD AUTO: 0.9 % (ref 0–1.5)
BILIRUB SERPL-MCNC: 0.3 MG/DL (ref 0–1.2)
BUN SERPL-MCNC: 11 MG/DL (ref 8–23)
BUN/CREAT SERPL: 17.2 (ref 7–25)
CALCIUM SPEC-SCNC: 9.4 MG/DL (ref 8.6–10.5)
CHLORIDE SERPL-SCNC: 104 MMOL/L (ref 98–107)
CHOLEST SERPL-MCNC: 230 MG/DL (ref 0–200)
CO2 SERPL-SCNC: 28.4 MMOL/L (ref 22–29)
CREAT SERPL-MCNC: 0.64 MG/DL (ref 0.57–1)
DEPRECATED RDW RBC AUTO: 42 FL (ref 37–54)
EGFRCR SERPLBLD CKD-EPI 2021: 97 ML/MIN/1.73
EOSINOPHIL # BLD AUTO: 0.19 10*3/MM3 (ref 0–0.4)
EOSINOPHIL NFR BLD AUTO: 2.8 % (ref 0.3–6.2)
ERYTHROCYTE [DISTWIDTH] IN BLOOD BY AUTOMATED COUNT: 12.2 % (ref 12.3–15.4)
GLOBULIN UR ELPH-MCNC: 2.5 GM/DL
GLUCOSE SERPL-MCNC: 88 MG/DL (ref 65–99)
HBA1C MFR BLD: 5.7 % (ref 3.5–5.6)
HCT VFR BLD AUTO: 39 % (ref 34–46.6)
HDLC SERPL-MCNC: 70 MG/DL (ref 40–60)
HGB BLD-MCNC: 13.5 G/DL (ref 12–15.9)
IMM GRANULOCYTES # BLD AUTO: 0.02 10*3/MM3 (ref 0–0.05)
IMM GRANULOCYTES NFR BLD AUTO: 0.3 % (ref 0–0.5)
LDLC SERPL CALC-MCNC: 145 MG/DL (ref 0–100)
LDLC/HDLC SERPL: 2.03 {RATIO}
LYMPHOCYTES # BLD AUTO: 2.11 10*3/MM3 (ref 0.7–3.1)
LYMPHOCYTES NFR BLD AUTO: 31.3 % (ref 19.6–45.3)
MCH RBC QN AUTO: 32.7 PG (ref 26.6–33)
MCHC RBC AUTO-ENTMCNC: 34.6 G/DL (ref 31.5–35.7)
MCV RBC AUTO: 94.4 FL (ref 79–97)
MONOCYTES # BLD AUTO: 0.62 10*3/MM3 (ref 0.1–0.9)
MONOCYTES NFR BLD AUTO: 9.2 % (ref 5–12)
NEUTROPHILS NFR BLD AUTO: 3.74 10*3/MM3 (ref 1.7–7)
NEUTROPHILS NFR BLD AUTO: 55.5 % (ref 42.7–76)
NRBC BLD AUTO-RTO: 0.1 /100 WBC (ref 0–0.2)
PLATELET # BLD AUTO: 283 10*3/MM3 (ref 140–450)
PMV BLD AUTO: 10.2 FL (ref 6–12)
POTASSIUM SERPL-SCNC: 4.1 MMOL/L (ref 3.5–5.2)
PROT SERPL-MCNC: 6.6 G/DL (ref 6–8.5)
RBC # BLD AUTO: 4.13 10*6/MM3 (ref 3.77–5.28)
SODIUM SERPL-SCNC: 142 MMOL/L (ref 136–145)
T4 FREE SERPL-MCNC: 0.92 NG/DL (ref 0.93–1.7)
TRIGL SERPL-MCNC: 88 MG/DL (ref 0–150)
TSH SERPL DL<=0.05 MIU/L-ACNC: 1.44 UIU/ML (ref 0.27–4.2)
VIT B12 BLD-MCNC: 354 PG/ML (ref 211–946)
VLDLC SERPL-MCNC: 15 MG/DL (ref 5–40)
WBC NRBC COR # BLD: 6.74 10*3/MM3 (ref 3.4–10.8)

## 2022-10-05 PROCEDURE — 80061 LIPID PANEL: CPT | Performed by: FAMILY MEDICINE

## 2022-10-05 PROCEDURE — 82607 VITAMIN B-12: CPT | Performed by: FAMILY MEDICINE

## 2022-10-05 PROCEDURE — 1160F RVW MEDS BY RX/DR IN RCRD: CPT | Performed by: FAMILY MEDICINE

## 2022-10-05 PROCEDURE — 83036 HEMOGLOBIN GLYCOSYLATED A1C: CPT | Performed by: FAMILY MEDICINE

## 2022-10-05 PROCEDURE — 82306 VITAMIN D 25 HYDROXY: CPT | Performed by: FAMILY MEDICINE

## 2022-10-05 PROCEDURE — 80053 COMPREHEN METABOLIC PANEL: CPT | Performed by: FAMILY MEDICINE

## 2022-10-05 PROCEDURE — G0439 PPPS, SUBSEQ VISIT: HCPCS | Performed by: FAMILY MEDICINE

## 2022-10-05 PROCEDURE — 1170F FXNL STATUS ASSESSED: CPT | Performed by: FAMILY MEDICINE

## 2022-10-05 PROCEDURE — 84439 ASSAY OF FREE THYROXINE: CPT | Performed by: FAMILY MEDICINE

## 2022-10-05 PROCEDURE — 99213 OFFICE O/P EST LOW 20 MIN: CPT | Performed by: FAMILY MEDICINE

## 2022-10-05 PROCEDURE — 84443 ASSAY THYROID STIM HORMONE: CPT | Performed by: FAMILY MEDICINE

## 2022-10-05 PROCEDURE — 36415 COLL VENOUS BLD VENIPUNCTURE: CPT | Performed by: FAMILY MEDICINE

## 2022-10-05 PROCEDURE — 85025 COMPLETE CBC W/AUTO DIFF WBC: CPT | Performed by: FAMILY MEDICINE

## 2022-10-05 RX ORDER — ASPIRIN 81 MG/1
81 TABLET, CHEWABLE ORAL DAILY
COMMUNITY

## 2022-10-05 RX ORDER — ESCITALOPRAM OXALATE 10 MG/1
10 TABLET ORAL DAILY
Qty: 90 TABLET | Refills: 3 | Status: SHIPPED | OUTPATIENT
Start: 2022-10-05 | End: 2023-03-08

## 2022-10-05 NOTE — PROGRESS NOTES
The ABCs of the Annual Wellness Visit  Subsequent Medicare Wellness Visit    Chief Complaint   Patient presents with   • Medicare Wellness-subsequent      Subjective    History of Present Illness:  Mehnaz Raines is a 66 y.o. female who presents for a Subsequent Medicare Wellness Visit.    HTN: 120/76 today. No longer maintained HCTZ. BP has generally been right around this number at home. No LH/dizziness, CP or SOB     Anxiety/depression: maintained on Lexapro 5 daily. Only requiring Ativan 0.5mg about 4-5x/month. Exacerbated by  screaming and having to care for her 's father as well. Interested in increasing Lexapro.      Vocal cord tremor: maintained on primidone 50 BID. This provides good relief. Happy w/ current control.     Cervical dysplasia: diagnosed 9/2022 after pap concerning for squamous cell carcinoma. Underwent cold cone resection and pathology c/w severe dysplasia (CIERA II-III). Planning for upcoming hysterectomy w/ [facility]o. No further treatment planned     Neck pain: maintained on meloxicam 7.5mg daily. This provides good relief. She is also doing PT, which seems to be helping as well. MRI was performed w/ concern for cord compression. She was subsequently seen by neurosurgery. NSG saw fluid all the way around the cord and did not recommend surgical intervention    Hand numbness: patient reports bilateral hand numbness, R>L. She reports continuous numbness to R 3rd and 4th digits. Endorses pain w/ tapping palm. Maintained meloxicam 7.5 daily and wrist immobilizers at night.     The following portions of the patient's history were reviewed and updated as appropriate: allergies, current medications, past family history, past medical history, past social history, past surgical history and problem list.    Compared to one year ago, the patient feels her physical   health is worse.    Compared to one year ago, the patient feels her mental   health is worse.    Recent Hospitalizations:  She was  not admitted to the hospital during the last year.     Current Medical Providers:  Patient Care Team:  Abhishek Manyard MD as PCP - General (Internal Medicine)    Outpatient Medications Prior to Visit   Medication Sig Dispense Refill   • aspirin 81 MG chewable tablet Chew 81 mg Daily.     • LORazepam (ATIVAN) 0.5 MG tablet Take 1 tablet by mouth 2 (Two) Times a Day As Needed for Anxiety. 50 tablet 0   • primidone (MYSOLINE) 50 MG tablet TAKE 1 TABLET BY MOUTH TWICE A  tablet 3   • escitalopram (LEXAPRO) 5 MG tablet TAKE 1 TABLET BY MOUTH EVERY DAY 90 tablet 3     No facility-administered medications prior to visit.     No opioid medication identified on active medication list. I have reviewed chart for other potential  high risk medication/s and harmful drug interactions in the elderly.        Aspirin is not on active medication list.  Aspirin use is not indicated based on review of current medical condition/s. Risk of harm outweighs potential benefits.  .    Patient Active Problem List   Diagnosis   • Chest pain   • Dysphonia   • Family history of diabetes mellitus   • Gastroenteritis   • Generalized anxiety disorder   • Hyperlipidemia   • Hypertension   • Depression   • Acquired night blindness   • Vitreous degeneration   • Osteoarthritis   • Cervical radiculopathy     Advance Care Planning  Advance Directive is on file.  ACP discussion was held with the patient during this visit. Patient has an advance directive in EMR which is still valid.   Review of Systems   Constitutional: Negative for chills and fever.   HENT: Positive for voice change. Negative for congestion and rhinorrhea.    Eyes: Negative for visual disturbance.   Respiratory: Negative for cough and shortness of breath.    Cardiovascular: Negative for chest pain and palpitations.   Gastrointestinal: Negative for abdominal pain and vomiting.   Genitourinary: Negative for difficulty urinating and dysuria.   Musculoskeletal: Positive for neck  "pain. Negative for arthralgias and back pain.   Skin: Negative for rash.   Neurological: Positive for numbness. Negative for dizziness and light-headedness.   Psychiatric/Behavioral: Negative for dysphoric mood and sleep disturbance. The patient is nervous/anxious.         Objective    Vitals:    10/05/22 1015   BP: 120/76   Pulse: 67   Resp: 16   Temp: 97.7 °F (36.5 °C)   TempSrc: Infrared   SpO2: 99%   Weight: 59.2 kg (130 lb 9.6 oz)   Height: 162.6 cm (64.02\")     Estimated body mass index is 22.41 kg/m² as calculated from the following:    Height as of this encounter: 162.6 cm (64.02\").    Weight as of this encounter: 59.2 kg (130 lb 9.6 oz).    BMI is within normal parameters. No other follow-up for BMI required.    Does the patient have evidence of cognitive impairment? No    Physical Exam  Constitutional:       General: She is not in acute distress.     Appearance: She is well-developed.   HENT:      Head: Normocephalic and atraumatic.      Right Ear: Tympanic membrane and external ear normal. There is no impacted cerumen.      Left Ear: Tympanic membrane and external ear normal. There is no impacted cerumen.      Nose: Nose normal.      Mouth/Throat:      Mouth: Mucous membranes are moist.      Pharynx: No oropharyngeal exudate or posterior oropharyngeal erythema.   Eyes:      General: No scleral icterus.        Right eye: No discharge.         Left eye: No discharge.      Extraocular Movements: Extraocular movements intact.      Conjunctiva/sclera: Conjunctivae normal.      Pupils: Pupils are equal, round, and reactive to light.   Neck:      Thyroid: No thyromegaly.      Vascular: No carotid bruit.   Cardiovascular:      Rate and Rhythm: Normal rate and regular rhythm.      Heart sounds: Normal heart sounds. No murmur heard.  Pulmonary:      Effort: Pulmonary effort is normal. No respiratory distress.      Breath sounds: Normal breath sounds. No wheezing or rales.   Abdominal:      General: Bowel sounds are " normal. There is no distension.      Palpations: Abdomen is soft.      Tenderness: There is no abdominal tenderness.   Musculoskeletal:         General: No deformity. Normal range of motion.      Cervical back: Normal range of motion and neck supple.   Lymphadenopathy:      Cervical: No cervical adenopathy.   Skin:     General: Skin is warm and dry.      Findings: No rash.   Neurological:      General: No focal deficit present.      Mental Status: She is alert and oriented to person, place, and time. Mental status is at baseline.      Cranial Nerves: No cranial nerve deficit.      Sensory: No sensory deficit.   Psychiatric:         Behavior: Behavior normal.         Thought Content: Thought content normal.             HEALTH RISK ASSESSMENT    Smoking Status:  Social History     Tobacco Use   Smoking Status Never Smoker   Smokeless Tobacco Never Used     Alcohol Consumption:  Social History     Substance and Sexual Activity   Alcohol Use No     Fall Risk Screen:  STEADI Fall Risk Assessment was completed, and patient is at LOW risk for falls.Assessment completed on:10/5/2022    Depression Screening:  PHQ-2/PHQ-9 Depression Screening 10/5/2022   Retired PHQ-9 Total Score -   Retired Total Score -   Little Interest or Pleasure in Doing Things 1-->several days   Feeling Down, Depressed or Hopeless 1-->several days   Trouble Falling or Staying Asleep, or Sleeping Too Much 0-->not at all   Feeling Tired or Having Little Energy 0-->not at all   Poor Appetite or Overeating 0-->not at all   Feeling Bad about Yourself - or that You are a Failure or Have Let Yourself or Your Family Down 0-->not at all   Trouble Concentrating on Things, Such as Reading the Newspaper or Watching Television 1-->several days   Moving or Speaking So Slowly that Other People Could Have Noticed? Or the Opposite - Being So Fidgety 0-->not at all   Thoughts that You Would be Better Off Dead or of Hurting Yourself in Some Way 0-->not at all   PHQ-9:  Brief Depression Severity Measure Score 3   If You Checked Off Any Problems, How Difficult Have These Problems Made It For You to Do Your Work, Take Care of Things at Home, or Get Along with Other People? somewhat difficult   Overwhelmed by caring for her  and his father.  has been screaming more    Health Habits and Functional and Cognitive Screening:  Functional & Cognitive Status 9/29/2021   Do you have difficulty preparing food and eating? No   Do you have difficulty bathing yourself, getting dressed or grooming yourself? No   Do you have difficulty using the toilet? No   Do you have difficulty moving around from place to place? No   Do you have trouble with steps or getting out of a bed or a chair? No   Current Diet Well Balanced Diet   Dental Exam Up to date   Eye Exam Up to date   Exercise (times per week) 7 times per week   Current Exercises Include Walking        Exercise Comment silver sneakers   Current Exercise Activities Include -   Do you need help using the phone?  No   Are you deaf or do you have serious difficulty hearing?  No   Do you need help with transportation? No   Do you need help shopping? No   Do you need help preparing meals?  No   Do you need help with housework?  No   Do you need help with laundry? No   Do you need help taking your medications? No   Do you need help managing money? No   Do you ever drive or ride in a car without wearing a seat belt? No   Have you felt unusual stress, anger or loneliness in the last month? No   Who do you live with? Spouse   If you need help, do you have trouble finding someone available to you? No   Have you been bothered in the last four weeks by sexual problems? No   Do you have difficulty concentrating, remembering or making decisions? Yes     Age-appropriate Screening Schedule:  Refer to the list below for future screening recommendations based on patient's age, sex and/or medical conditions. Orders for these recommended tests are  listed in the plan section. The patient has been provided with a written plan.    Health Maintenance   Topic Date Due   • INFLUENZA VACCINE  08/01/2022   • DXA SCAN  09/10/2022   • LIPID PANEL  09/29/2022   • MAMMOGRAM  09/12/2024   • TDAP/TD VACCINES (2 - Td or Tdap) 05/06/2030   • ZOSTER VACCINE  Completed        Assessment & Plan   CMS Preventative Services Quick Reference  Risk Factors Identified During Encounter  Chronic Pain   Immunizations Discussed/Encouraged (specific Immunizations; Influenza, Prevnar 20 (Pneumococcal 20-valent conjugate) and COVID19  The above risks/problems have been discussed with the patient.  Follow up actions/plans if indicated are seen below in the Assessment/Plan Section.  Pertinent information has been shared with the patient in the After Visit Summary.    Diagnoses and all orders for this visit:    1. Medicare annual wellness visit, subsequent (Primary)  -     CBC & Differential  -     Comprehensive Metabolic Panel  -     Hemoglobin A1c  -     Lipid Panel  -     TSH  -     T4, Free  -     Vitamin D 25 Hydroxy  -     Vitamin B12  -  Counseled regarding diet, exercise, weight loss, and preventative health maintenance items/immunizations below    2. Primary hypertension: 120/76 today  -     Comprehensive Metabolic Panel  - Monitor off medication    3. Generalized anxiety disorder: acutely exacerbated by caring for  and his father  -     Increase escitalopram (LEXAPRO) 10 MG tablet; Take 1 tablet by mouth Daily.  Dispense: 90 tablet; Refill: 3  - Cont home ativan 0.5mg PRN    4. Dysphonia   - Cont home primidone 50mg BID    5. Cervical dysplasia: s/p resection. Final pathology w/ CIERA II-III   - Cont GYN f/u   - Plan for hysterectomy next month    6. Cervical radiculopathy   - Cont home meloxicam 7.5mg daily   - Cont PT    7. Bilateral carpal tunnel syndrome  -     Ambulatory Referral to Hand Surgery  - Cont home meloxicam 7.5mg daily and wrist immobilizers nightly    8. Vitamin  D deficiency, unspecified   -     Vitamin D 25 Hydroxy    Preventative:   Colonoscopy: 6/2022, due 6/2029  Mammogram: 9/2022  Pap smear: 9/2022- c/w cervical cancer- follows w/ [facility]o  DEXA: 9/2020- normal. Deferred to 2023  Shingles: Shingrix 2/2020, Zostavax 10/2015  Pneumonia: Pneumovax 9/2020, PCV20 deferred  Tdap: 5/2020  Influenza: 10/2021, recommended- deferred  COVID: No shots, had illness twice    Follow Up:   Return in about 1 year (around 10/5/2023) for Medicare Wellness.     An After Visit Summary and PPPS were made available to the patient.        I spent 34 minutes caring for Mehnaz on this date of service. This time includes time spent by me in the following activities:reviewing tests, performing a medically appropriate examination and/or evaluation , counseling and educating the patient/family/caregiver, ordering medications, tests, or procedures and documenting information in the medical record

## 2022-10-17 ENCOUNTER — TREATMENT (OUTPATIENT)
Dept: PHYSICAL THERAPY | Facility: CLINIC | Age: 67
End: 2022-10-17

## 2022-10-17 DIAGNOSIS — M54.12 RADICULOPATHY, CERVICAL: Primary | ICD-10-CM

## 2022-10-17 PROCEDURE — G0283 ELEC STIM OTHER THAN WOUND: HCPCS | Performed by: PHYSICAL THERAPIST

## 2022-10-17 PROCEDURE — 97110 THERAPEUTIC EXERCISES: CPT | Performed by: PHYSICAL THERAPIST

## 2022-10-17 PROCEDURE — 97140 MANUAL THERAPY 1/> REGIONS: CPT | Performed by: PHYSICAL THERAPIST

## 2022-10-17 NOTE — PROGRESS NOTES
Discharge Summary  Discharge Summary from Physical Therapy Report          Goals: Partially Met    Discharge Plan: Continue with current home exercise program as instructed    Comments Patient voices readiness for discharge.  See note below.    Date of Discharge 10/17/22        Robin A Sprigler, PT  Physical Therapist               Physical Therapy Daily Progress Note    Patient: Mehnaz Raines   : 1955  Diagnosis/ICD-10 Code:  Radiculopathy, cervical [M54.12]  Referring practitioner: Abhishek Maynard MD  Date of Initial Visit: Type: THERAPY  Noted: 2022  Today's Date: 10/17/2022  Patient seen for 14 sessions             Subjective Patient reports she is scheduled for her hysterectomy 10/24/22 so last PT visit will be today.  She reports she is off her Myloxicam for the 2 weeks prior to the surgery so neck pain is flared a bit.  She is arranging care for her father-in-law for 2 weeks while she recuperates.  She reports her  has an order for home health PT and OT.  Patient voices readiness for discharge to self-management and HEP.    Objective   See Exercise, Manual, and Modality Logs for complete treatment. Reassessed NDI today which indicates 16% impairment, compared to 36% initially.  UT stretch brings the most relief.  Still trigger point in B UTs with muscle guarding.        Assessment/Plan  Patient independent with HEP in 2 weeks - MET  Tolerate 10 min Nustep in 2 weeks - MET  Decrease arm numbness at night 25% in 2 weeks -  MET  Improve function as evidenced by NDI score of 20% or less in 12 weeks (36% impairment initially) - MET, 14% on 22  Able to drive without arm numbness in 12 weeks - NOT MET  Able to manage symptoms with HEP, home traction, inversion table, etc. in 12 weeks  - PARTIALLY MET     Discharge to HEP and self-management.           Timed:         Manual Therapy:    15     mins  69911;     Therapeutic Exercise:    15     mins  68436;     Neuromuscular Shaye:         mins  79850;    Therapeutic Activity:          mins  72121;     Gait Training:           mins  93910;     Ultrasound:          mins  87355;    Ionto                                   mins   08146  Self Care                            mins   67212      Un-Timed:  Electrical Stimulation:    15     mins  91162 ( );  Dry Needling          mins self-pay  Traction          mins 13142  Low Eval          Mins  59334  Mod Eval          Mins  79980  High Eval                            Mins  98755  Canalith Repos                   mins  90914    Timed Treatment:   30   mins   Total Treatment:     45   mins    Robin A Sprigler, PT  Physical Therapist

## 2022-10-19 ENCOUNTER — LAB (OUTPATIENT)
Dept: LAB | Facility: HOSPITAL | Age: 67
End: 2022-10-19

## 2022-10-19 ENCOUNTER — TRANSCRIBE ORDERS (OUTPATIENT)
Dept: ADMINISTRATIVE | Facility: HOSPITAL | Age: 67
End: 2022-10-19

## 2022-10-19 ENCOUNTER — HOSPITAL ENCOUNTER (OUTPATIENT)
Dept: GENERAL RADIOLOGY | Facility: HOSPITAL | Age: 67
Discharge: HOME | End: 2022-10-19

## 2022-10-19 DIAGNOSIS — Z01.810 PRE-OPERATIVE CARDIOVASCULAR EXAMINATION: ICD-10-CM

## 2022-10-19 DIAGNOSIS — Z01.811 PRE-OPERATIVE RESPIRATORY EXAMINATION: ICD-10-CM

## 2022-10-19 DIAGNOSIS — Z01.810 PRE-OPERATIVE CARDIOVASCULAR EXAMINATION: Primary | ICD-10-CM

## 2022-10-19 LAB
ABO GROUP BLD: NORMAL
BLD GP AB SCN SERPL QL: NEGATIVE
RH BLD: POSITIVE
T&S EXPIRATION DATE: NORMAL

## 2022-10-19 PROCEDURE — 86901 BLOOD TYPING SEROLOGIC RH(D): CPT

## 2022-10-19 PROCEDURE — 86900 BLOOD TYPING SEROLOGIC ABO: CPT

## 2022-10-19 PROCEDURE — 36415 COLL VENOUS BLD VENIPUNCTURE: CPT

## 2022-10-19 PROCEDURE — 86850 RBC ANTIBODY SCREEN: CPT

## 2022-10-19 PROCEDURE — 71046 X-RAY EXAM CHEST 2 VIEWS: CPT

## 2022-10-19 PROCEDURE — 85025 COMPLETE CBC W/AUTO DIFF WBC: CPT

## 2022-10-20 LAB
BASOPHILS # BLD AUTO: 0.08 10*3/MM3 (ref 0–0.2)
BASOPHILS NFR BLD AUTO: 1.3 % (ref 0–1.5)
DEPRECATED RDW RBC AUTO: 41.9 FL (ref 37–54)
EOSINOPHIL # BLD AUTO: 0.29 10*3/MM3 (ref 0–0.4)
EOSINOPHIL NFR BLD AUTO: 4.8 % (ref 0.3–6.2)
ERYTHROCYTE [DISTWIDTH] IN BLOOD BY AUTOMATED COUNT: 12.2 % (ref 12.3–15.4)
HCT VFR BLD AUTO: 36.3 % (ref 34–46.6)
HGB BLD-MCNC: 12.7 G/DL (ref 12–15.9)
IMM GRANULOCYTES # BLD AUTO: 0.02 10*3/MM3 (ref 0–0.05)
IMM GRANULOCYTES NFR BLD AUTO: 0.3 % (ref 0–0.5)
LYMPHOCYTES # BLD AUTO: 2.56 10*3/MM3 (ref 0.7–3.1)
LYMPHOCYTES NFR BLD AUTO: 42.4 % (ref 19.6–45.3)
MCH RBC QN AUTO: 32.3 PG (ref 26.6–33)
MCHC RBC AUTO-ENTMCNC: 35 G/DL (ref 31.5–35.7)
MCV RBC AUTO: 92.4 FL (ref 79–97)
MONOCYTES # BLD AUTO: 0.6 10*3/MM3 (ref 0.1–0.9)
MONOCYTES NFR BLD AUTO: 9.9 % (ref 5–12)
NEUTROPHILS NFR BLD AUTO: 2.49 10*3/MM3 (ref 1.7–7)
NEUTROPHILS NFR BLD AUTO: 41.3 % (ref 42.7–76)
NRBC BLD AUTO-RTO: 0 /100 WBC (ref 0–0.2)
PLATELET # BLD AUTO: 285 10*3/MM3 (ref 140–450)
PMV BLD AUTO: 10 FL (ref 6–12)
RBC # BLD AUTO: 3.93 10*6/MM3 (ref 3.77–5.28)
WBC NRBC COR # BLD: 6.04 10*3/MM3 (ref 3.4–10.8)

## 2022-10-24 ENCOUNTER — LAB REQUISITION (OUTPATIENT)
Dept: LAB | Facility: HOSPITAL | Age: 67
End: 2022-10-24

## 2022-10-24 DIAGNOSIS — D06.9 CARCINOMA IN SITU OF CERVIX, UNSPECIFIED: ICD-10-CM

## 2022-10-24 PROCEDURE — 88307 TISSUE EXAM BY PATHOLOGIST: CPT | Performed by: OBSTETRICS & GYNECOLOGY

## 2022-10-25 LAB
LAB AP CASE REPORT: NORMAL
PATH REPORT.FINAL DX SPEC: NORMAL
PATH REPORT.GROSS SPEC: NORMAL

## 2022-11-10 NOTE — PROGRESS NOTES
EMG and Nerve Conduction Studies    The complete report includes the data sheets.     Date of Study: 11/15/22    Referring Provider: DR. 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    Summary:    Prior to starting the procedure, the patient's identity was verified, pertinent available records were reviewed, the nature of the procedure was explained, the appropriate site of the exam were confirmed directly with the patient, and a pre-procedure pause was performed for final verification of all the above.    1.  The right median sensory and motor distal latencies were prolonged.  The right median motor forearm conduction velocity was normal.    2.  The right ulnar sensory and motor nerve conduction studies were normal.    3.  EMG of the muscles examined in the right upper extremity and the C5-T1 myotomes were normal except for minor neurogenic changes in the right abductor pollicis brevis muscle.    Impression:    This is an abnormal study.  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      Electronically signed by :    Joseph Seipel, M.D.  November 15, 2022

## 2022-11-15 ENCOUNTER — PROCEDURE VISIT (OUTPATIENT)
Dept: NEUROLOGY | Facility: CLINIC | Age: 67
End: 2022-11-15

## 2022-11-15 VITALS
HEIGHT: 64 IN | WEIGHT: 130 LBS | BODY MASS INDEX: 22.2 KG/M2 | SYSTOLIC BLOOD PRESSURE: 128 MMHG | TEMPERATURE: 97.7 F | HEART RATE: 69 BPM | DIASTOLIC BLOOD PRESSURE: 81 MMHG

## 2022-11-15 DIAGNOSIS — R20.0 NUMBNESS: ICD-10-CM

## 2022-11-15 DIAGNOSIS — G56.01 CARPAL TUNNEL SYNDROME, RIGHT: Primary | ICD-10-CM

## 2022-11-15 PROCEDURE — 95908 NRV CNDJ TST 3-4 STUDIES: CPT | Performed by: PSYCHIATRY & NEUROLOGY

## 2022-11-15 PROCEDURE — 95886 MUSC TEST DONE W/N TEST COMP: CPT | Performed by: PSYCHIATRY & NEUROLOGY

## 2022-11-15 RX ORDER — MELOXICAM 7.5 MG/1
TABLET ORAL
COMMUNITY
Start: 2022-11-13 | End: 2023-03-08

## 2022-11-15 RX ORDER — ONDANSETRON 4 MG/1
4 TABLET, FILM COATED ORAL EVERY 6 HOURS PRN
COMMUNITY
Start: 2022-10-25 | End: 2023-03-08

## 2022-11-15 RX ORDER — HYDROCODONE BITARTRATE AND ACETAMINOPHEN 5; 325 MG/1; MG/1
1 TABLET ORAL EVERY 6 HOURS PRN
COMMUNITY
Start: 2022-10-25 | End: 2023-03-08

## 2022-11-16 DIAGNOSIS — G56.00 CARPAL TUNNEL SYNDROME, UNSPECIFIED LATERALITY: Primary | ICD-10-CM

## 2022-11-28 RX ORDER — HYDROCHLOROTHIAZIDE 25 MG/1
25 TABLET ORAL DAILY
Qty: 90 TABLET | Refills: 1 | Status: SHIPPED | OUTPATIENT
Start: 2022-11-28

## 2023-03-08 ENCOUNTER — OFFICE VISIT (OUTPATIENT)
Dept: FAMILY MEDICINE CLINIC | Facility: CLINIC | Age: 68
End: 2023-03-08
Payer: MEDICARE

## 2023-03-08 VITALS
WEIGHT: 130 LBS | RESPIRATION RATE: 15 BRPM | HEART RATE: 80 BPM | TEMPERATURE: 97.5 F | SYSTOLIC BLOOD PRESSURE: 123 MMHG | DIASTOLIC BLOOD PRESSURE: 69 MMHG | OXYGEN SATURATION: 99 % | BODY MASS INDEX: 22.2 KG/M2 | HEIGHT: 64 IN

## 2023-03-08 DIAGNOSIS — R21 RASH: Primary | ICD-10-CM

## 2023-03-08 PROCEDURE — 99213 OFFICE O/P EST LOW 20 MIN: CPT | Performed by: NURSE PRACTITIONER

## 2023-03-08 RX ORDER — METHYLPREDNISOLONE 4 MG/1
TABLET ORAL
Qty: 21 TABLET | Refills: 0 | Status: SHIPPED | OUTPATIENT
Start: 2023-03-08

## 2023-03-08 NOTE — PROGRESS NOTES
"Chief Complaint  Rash (Since March 1st)  Subjective        Mehnaz Raines presents to [facility]  History of Present Illness  Pt comes in today with c/o rash right lateral upper leg, right knee, right arm, and elbow  Started around March 1.   Denies any new lotions, soaps, detergents, dryer sheets, medications, foods.   Has tried hydrocortisone cream on it and it helps to calm it down.         Objective     Vital Signs:   /69   Pulse 80   Temp 97.5 °F (36.4 °C)   Resp 15   Ht 162.6 cm (64\")   Wt 59 kg (130 lb)   SpO2 99%   BMI 22.31 kg/m²       BP Readings from Last 3 Encounters:   03/08/23 123/69   11/15/22 128/81   10/05/22 120/76       Wt Readings from Last 3 Encounters:   03/08/23 59 kg (130 lb)   11/15/22 59 kg (130 lb)   10/05/22 59.2 kg (130 lb 9.6 oz)     Physical Exam  Constitutional:       Appearance: She is well-developed.   Eyes:      Pupils: Pupils are equal, round, and reactive to light.   Cardiovascular:      Rate and Rhythm: Normal rate and regular rhythm.   Pulmonary:      Effort: Pulmonary effort is normal.      Breath sounds: Normal breath sounds.   Skin:     Comments: Right lateral leg with faint raised papular rash    Neurological:      Mental Status: She is alert and oriented to person, place, and time.        Result Review :                 Assessment and Plan    Diagnoses and all orders for this visit:    1. Rash (Primary)  -     methylPREDNISolone (MEDROL) 4 MG dose pack; Take as directed on package instructions.  Dispense: 21 tablet; Refill: 0    medrol dose pack  Cont hydrocortisone cream prn   During this office visit, we discussed the pertinent aspects of the visit and treatment recommendations. Pt verbalizes understanding. Follow up was discussed. Patient was given the opportunity to ask questions and discuss other concerns.         Follow Up   No follow-ups on file.  Patient was given instructions and counseling regarding her condition or " for health maintenance advice. Please see specific information pulled into the AVS if appropriate.

## 2023-03-13 ENCOUNTER — TRANSCRIBE ORDERS (OUTPATIENT)
Dept: ADMINISTRATIVE | Facility: HOSPITAL | Age: 68
End: 2023-03-13
Payer: MEDICARE

## 2023-03-13 DIAGNOSIS — Z13.6 ENCOUNTER FOR SCREENING FOR VASCULAR DISEASE: Primary | ICD-10-CM

## 2023-03-13 DIAGNOSIS — Z13.9 SCREENING DUE: ICD-10-CM

## 2023-04-24 ENCOUNTER — LAB (OUTPATIENT)
Dept: FAMILY MEDICINE CLINIC | Facility: CLINIC | Age: 68
End: 2023-04-24
Payer: MEDICARE

## 2023-04-24 ENCOUNTER — OFFICE VISIT (OUTPATIENT)
Dept: FAMILY MEDICINE CLINIC | Facility: CLINIC | Age: 68
End: 2023-04-24
Payer: MEDICARE

## 2023-04-24 VITALS
DIASTOLIC BLOOD PRESSURE: 70 MMHG | SYSTOLIC BLOOD PRESSURE: 110 MMHG | HEIGHT: 64 IN | BODY MASS INDEX: 22.16 KG/M2 | WEIGHT: 129.8 LBS | HEART RATE: 76 BPM | OXYGEN SATURATION: 97 %

## 2023-04-24 DIAGNOSIS — R21 SKIN RASH: ICD-10-CM

## 2023-04-24 DIAGNOSIS — L29.9 ITCHING: Primary | ICD-10-CM

## 2023-04-24 LAB
ALBUMIN SERPL-MCNC: 4.2 G/DL (ref 3.5–5.2)
ALBUMIN/GLOB SERPL: 1.6 G/DL
ALP SERPL-CCNC: 96 U/L (ref 39–117)
ALT SERPL W P-5'-P-CCNC: 19 U/L (ref 1–33)
ANION GAP SERPL CALCULATED.3IONS-SCNC: 11 MMOL/L (ref 5–15)
AST SERPL-CCNC: 27 U/L (ref 1–32)
BASOPHILS # BLD AUTO: 0.06 10*3/MM3 (ref 0–0.2)
BASOPHILS NFR BLD AUTO: 1 % (ref 0–1.5)
BILIRUB SERPL-MCNC: 0.4 MG/DL (ref 0–1.2)
BUN SERPL-MCNC: 8 MG/DL (ref 8–23)
BUN/CREAT SERPL: 14 (ref 7–25)
CALCIUM SPEC-SCNC: 9.2 MG/DL (ref 8.6–10.5)
CHLORIDE SERPL-SCNC: 98 MMOL/L (ref 98–107)
CO2 SERPL-SCNC: 29 MMOL/L (ref 22–29)
CREAT SERPL-MCNC: 0.57 MG/DL (ref 0.57–1)
DEPRECATED RDW RBC AUTO: 43.5 FL (ref 37–54)
EGFRCR SERPLBLD CKD-EPI 2021: 99.1 ML/MIN/1.73
EOSINOPHIL # BLD AUTO: 0.21 10*3/MM3 (ref 0–0.4)
EOSINOPHIL NFR BLD AUTO: 3.6 % (ref 0.3–6.2)
ERYTHROCYTE [DISTWIDTH] IN BLOOD BY AUTOMATED COUNT: 12.9 % (ref 12.3–15.4)
ERYTHROCYTE [SEDIMENTATION RATE] IN BLOOD: 15 MM/HR (ref 0–30)
GLOBULIN UR ELPH-MCNC: 2.6 GM/DL
GLUCOSE SERPL-MCNC: 91 MG/DL (ref 65–99)
HCT VFR BLD AUTO: 38.9 % (ref 34–46.6)
HGB BLD-MCNC: 13.3 G/DL (ref 12–15.9)
IMM GRANULOCYTES # BLD AUTO: 0.02 10*3/MM3 (ref 0–0.05)
IMM GRANULOCYTES NFR BLD AUTO: 0.3 % (ref 0–0.5)
LYMPHOCYTES # BLD AUTO: 1.74 10*3/MM3 (ref 0.7–3.1)
LYMPHOCYTES NFR BLD AUTO: 30.2 % (ref 19.6–45.3)
MCH RBC QN AUTO: 32.1 PG (ref 26.6–33)
MCHC RBC AUTO-ENTMCNC: 34.2 G/DL (ref 31.5–35.7)
MCV RBC AUTO: 94 FL (ref 79–97)
MONOCYTES # BLD AUTO: 0.53 10*3/MM3 (ref 0.1–0.9)
MONOCYTES NFR BLD AUTO: 9.2 % (ref 5–12)
NEUTROPHILS NFR BLD AUTO: 3.21 10*3/MM3 (ref 1.7–7)
NEUTROPHILS NFR BLD AUTO: 55.7 % (ref 42.7–76)
NRBC BLD AUTO-RTO: 0 /100 WBC (ref 0–0.2)
PLATELET # BLD AUTO: 321 10*3/MM3 (ref 140–450)
PMV BLD AUTO: 10 FL (ref 6–12)
POTASSIUM SERPL-SCNC: 3.7 MMOL/L (ref 3.5–5.2)
PROT SERPL-MCNC: 6.8 G/DL (ref 6–8.5)
RBC # BLD AUTO: 4.14 10*6/MM3 (ref 3.77–5.28)
SODIUM SERPL-SCNC: 138 MMOL/L (ref 136–145)
TSH SERPL DL<=0.05 MIU/L-ACNC: 1.77 UIU/ML (ref 0.27–4.2)
WBC NRBC COR # BLD: 5.77 10*3/MM3 (ref 3.4–10.8)

## 2023-04-24 PROCEDURE — 99214 OFFICE O/P EST MOD 30 MIN: CPT | Performed by: STUDENT IN AN ORGANIZED HEALTH CARE EDUCATION/TRAINING PROGRAM

## 2023-04-24 PROCEDURE — 3078F DIAST BP <80 MM HG: CPT | Performed by: STUDENT IN AN ORGANIZED HEALTH CARE EDUCATION/TRAINING PROGRAM

## 2023-04-24 PROCEDURE — 36415 COLL VENOUS BLD VENIPUNCTURE: CPT | Performed by: STUDENT IN AN ORGANIZED HEALTH CARE EDUCATION/TRAINING PROGRAM

## 2023-04-24 PROCEDURE — 3074F SYST BP LT 130 MM HG: CPT | Performed by: STUDENT IN AN ORGANIZED HEALTH CARE EDUCATION/TRAINING PROGRAM

## 2023-04-24 PROCEDURE — 86038 ANTINUCLEAR ANTIBODIES: CPT | Performed by: STUDENT IN AN ORGANIZED HEALTH CARE EDUCATION/TRAINING PROGRAM

## 2023-04-24 PROCEDURE — 80053 COMPREHEN METABOLIC PANEL: CPT | Performed by: STUDENT IN AN ORGANIZED HEALTH CARE EDUCATION/TRAINING PROGRAM

## 2023-04-24 PROCEDURE — 85025 COMPLETE CBC W/AUTO DIFF WBC: CPT | Performed by: STUDENT IN AN ORGANIZED HEALTH CARE EDUCATION/TRAINING PROGRAM

## 2023-04-24 PROCEDURE — 84443 ASSAY THYROID STIM HORMONE: CPT | Performed by: STUDENT IN AN ORGANIZED HEALTH CARE EDUCATION/TRAINING PROGRAM

## 2023-04-24 PROCEDURE — 85652 RBC SED RATE AUTOMATED: CPT | Performed by: STUDENT IN AN ORGANIZED HEALTH CARE EDUCATION/TRAINING PROGRAM

## 2023-04-24 RX ORDER — HYDROXYZINE HYDROCHLORIDE 10 MG/1
10 TABLET, FILM COATED ORAL 3 TIMES DAILY PRN
Qty: 50 TABLET | Refills: 0 | Status: CANCELLED | OUTPATIENT
Start: 2023-04-24

## 2023-04-24 RX ORDER — TRIAMCINOLONE ACETONIDE 1 MG/G
CREAM TOPICAL
COMMUNITY
Start: 2023-03-27

## 2023-04-24 RX ORDER — DIPHENHYDRAMINE HCL 25 MG
25 CAPSULE ORAL EVERY 6 HOURS PRN
COMMUNITY

## 2023-04-24 NOTE — PROGRESS NOTES
"Chief Complaint  Chief Complaint   Patient presents with   • Animal Bite     CC: pt states she was bitten by a dog March 1st, saw a Dr here March 8th, gave her prednisone and told her to take Zyrtec, she saw Dermatology and they thought it was a reaction to Zyrtec, they gave her a cream. She said it is worse at night, no visible rash, but she can feel it, but extreme itching entire torso and upper arms and upper thighs, almost a feeling her skin is burning when her clothes brush it.  She said a shower seems to help some for a few hours.     Subjective        Mehnaz Raines is a 67 y.o. female who presents to [facility].    History of Present Illness  Bit by a dog on her thigh March 1st, rash started March 4th.  She saw Rhiannon March 8th and was given steroid pack and told to take zyrtec.  She saw dermatologist end of March and was told to stop zyrtec due to yellow die and given triamcinolone topical.  No new detergents, soaps, clothing, etc. she says she is a creature of habit and nothing new has been added.  She went back to dermatologist but because there was no rash they did not feel there was much more to do for her from their perspective.  She has been taking claritin which seems to be helping.  She is miserable though and needs some answers.    Objective   /70   Pulse 76   Ht 162.6 cm (64\")   Wt 58.9 kg (129 lb 12.8 oz)   SpO2 97%   BMI 22.28 kg/m²     Estimated body mass index is 22.28 kg/m² as calculated from the following:    Height as of this encounter: 162.6 cm (64\").    Weight as of this encounter: 58.9 kg (129 lb 12.8 oz).     Physical Exam   SKIN: No rash present but persistent itching throughout exam, overall seems very uncomfortable    PHQ-2 Depression Screening  Little interest or pleasure in doing things? 0-->not at all   Feeling down, depressed, or hopeless? 0-->not at all   PHQ-2 Total Score 0      Result Review :              Assessment and Plan "     Diagnoses and all orders for this visit:    1. Itching (Primary)  Differential broad for pruritus.  I reviewed her medications and do not think any of them could be contributing.  No new sensor soaps or detergents at home.  Obtain below labs to assess blood counts, kidney function and liver function, thyroid function.  She specifically requested OVIDIO and sedimentation rate.  We discussed that these are nonspecific and would require further follow-up should they come back positive.  Recommended resuming Claritin and regular fragrance free skin ointment application which she is currently doing twice a day.  Discussed hydroxyzine for itching but she preferred to hold off for now.  If blood work is unremarkable would recommend she see allergist.  -     CBC & Differential  -     Comprehensive Metabolic Panel  -     TSH Rfx On Abnormal To Free T4  -     OVIDIO  -     Sedimentation Rate    2. Skin rash  -     CBC & Differential  -     Comprehensive Metabolic Panel  -     TSH Rfx On Abnormal To Free T4  -     OVIDIO  -     Sedimentation Rate       Follow Up   Depending on above labs, may need to see allergist

## 2023-04-26 ENCOUNTER — TELEPHONE (OUTPATIENT)
Dept: FAMILY MEDICINE CLINIC | Facility: CLINIC | Age: 68
End: 2023-04-26
Payer: MEDICARE

## 2023-04-26 DIAGNOSIS — L29.9 ITCHING: Primary | ICD-10-CM

## 2023-04-26 DIAGNOSIS — R21 SKIN RASH: ICD-10-CM

## 2023-04-26 LAB — ANA SER QL: NEGATIVE

## 2023-04-26 NOTE — TELEPHONE ENCOUNTER
----- Message from Suresh Petit DO sent at 4/26/2023  3:08 PM EDT -----  Please let Mehnaz know the following regarding her labs: her OVIDIO, kidney function, liver function, thyroid function, blood counts, and sedimentation rate were all normal which is good news.  I would recommend she see an allergist.  I will send in a referral.

## 2023-04-26 NOTE — TELEPHONE ENCOUNTER
I spoke with Mehnaz and relayed Dr. Diaz message and let her know a referral to Dermatology has been sent in

## 2023-04-26 NOTE — TELEPHONE ENCOUNTER
----- Message from Gloria Cronin MA sent at 4/26/2023 11:07 AM EDT -----  Regarding: FW: F/U visit  Contact: [phone]    ----- Message -----  From: Mehnaz Raines  Sent: 4/26/2023   9:27 AM EDT  To: Susi Rapp [facility]  Subject: F/U visit                                        You said someone would call to give me next steps for this itching??  Labs don’t show anything but I don’t see the OVIDIO results.  Mehnaz

## 2023-05-17 DIAGNOSIS — R49.0 DYSPHONIA: ICD-10-CM

## 2023-05-17 RX ORDER — PRIMIDONE 50 MG/1
TABLET ORAL
Qty: 180 TABLET | Refills: 3 | Status: SHIPPED | OUTPATIENT
Start: 2023-05-17

## 2023-05-24 ENCOUNTER — OFFICE VISIT (OUTPATIENT)
Dept: FAMILY MEDICINE CLINIC | Facility: CLINIC | Age: 68
End: 2023-05-24
Payer: MEDICARE

## 2023-05-24 VITALS
OXYGEN SATURATION: 98 % | HEIGHT: 64 IN | WEIGHT: 132.58 LBS | BODY MASS INDEX: 22.64 KG/M2 | SYSTOLIC BLOOD PRESSURE: 128 MMHG | DIASTOLIC BLOOD PRESSURE: 68 MMHG | HEART RATE: 67 BPM

## 2023-05-24 DIAGNOSIS — F32.A DEPRESSION, UNSPECIFIED DEPRESSION TYPE: Primary | ICD-10-CM

## 2023-05-24 PROCEDURE — 1160F RVW MEDS BY RX/DR IN RCRD: CPT | Performed by: FAMILY MEDICINE

## 2023-05-24 PROCEDURE — 3078F DIAST BP <80 MM HG: CPT | Performed by: FAMILY MEDICINE

## 2023-05-24 PROCEDURE — 3074F SYST BP LT 130 MM HG: CPT | Performed by: FAMILY MEDICINE

## 2023-05-24 PROCEDURE — 1159F MED LIST DOCD IN RCRD: CPT | Performed by: FAMILY MEDICINE

## 2023-05-24 PROCEDURE — 99213 OFFICE O/P EST LOW 20 MIN: CPT | Performed by: FAMILY MEDICINE

## 2023-05-24 RX ORDER — ERGOCALCIFEROL (VITAMIN D2) 10 MCG
400 TABLET ORAL DAILY
COMMUNITY

## 2023-05-24 RX ORDER — CYCLOBENZAPRINE HCL 10 MG
10 TABLET ORAL 2 TIMES DAILY PRN
COMMUNITY

## 2023-05-24 RX ORDER — HYDROCHLOROTHIAZIDE 25 MG/1
TABLET ORAL
Qty: 90 TABLET | Refills: 1 | Status: SHIPPED | OUTPATIENT
Start: 2023-05-24

## 2023-05-24 RX ORDER — MELOXICAM 7.5 MG/1
7.5 TABLET ORAL DAILY
COMMUNITY

## 2023-05-24 RX ORDER — SERTRALINE HYDROCHLORIDE 25 MG/1
25 TABLET, FILM COATED ORAL DAILY
Qty: 90 TABLET | Refills: 1 | Status: SHIPPED | OUTPATIENT
Start: 2023-05-24

## 2023-05-24 NOTE — PROGRESS NOTES
Chief Complaint   Patient presents with   • Depression     HPI  Mehnaz Raines is a 67 y.o. female that presents for   Chief Complaint   Patient presents with   • Depression     Depression: patient was previously maintained on Lexapro daily but weaned off this due to bizarre, scary dreams. However, in the last 1-2 months, she has had increasing agitation and depressive symptoms. Interested in starting back on a medication but concerned about weight gain. She has tried Cymbalta (too sedating) and Zoloft (weight gain) in the past. Denies SI/HI. Working to get into counseling. Exercising regularly    Review of Systems   Psychiatric/Behavioral: Positive for agitation and depressed mood. Negative for suicidal ideas.     The following portions of the patient's history were reviewed and updated as appropriate: problem list, past medical history, past surgical history, allergies, current medication    Problem List Tab  Patient History Tab  Immunizations Tab  Medications Tab  Chart Review Tab  Care Everywhere Tab  Synopsis Tab    PE  Vitals:    05/24/23 1443   BP: 128/68   Pulse: 67   SpO2: 98%     Body mass index is 22.75 kg/m².  General: Well nourished, NAD  Head: AT/NC  Eyes: EOMI, anicteric sclera  Resp: CTAB, SCR, BS equal  CV: RRR w/o m/r/g; 2+ pulses  GI: Soft, NT/ND, +BS  MSK: FROM, no deformity, no edema  Skin: Warm, dry, intact  Neuro: Alert and oriented. No focal deficits  Psych: Appropriate mood and affect    Imaging  No Images in the past 120 days found..    Assessment & Plan   Mehnaz Raines is a 67 y.o. female that presents for   Chief Complaint   Patient presents with   • Depression     Diagnoses and all orders for this visit:    1. Depression, unspecified depression type (Primary)  -     Start sertraline (Zoloft) 25 MG tablet; Take 1 tablet by mouth Daily.  Dispense: 90 tablet; Refill: 1  - List of counselors/therapists given today  - Recommend regular exercise     Return if symptoms worsen or fail to  improve.

## 2023-08-04 ENCOUNTER — TELEPHONE (OUTPATIENT)
Dept: FAMILY MEDICINE CLINIC | Facility: CLINIC | Age: 68
End: 2023-08-04
Payer: MEDICARE

## 2023-10-24 ENCOUNTER — TRANSCRIBE ORDERS (OUTPATIENT)
Dept: ADMINISTRATIVE | Facility: HOSPITAL | Age: 68
End: 2023-10-24
Payer: MEDICARE

## 2023-10-24 DIAGNOSIS — Z12.31 VISIT FOR SCREENING MAMMOGRAM: Primary | ICD-10-CM

## 2023-10-30 ENCOUNTER — OFFICE VISIT (OUTPATIENT)
Dept: FAMILY MEDICINE CLINIC | Facility: CLINIC | Age: 68
End: 2023-10-30
Payer: MEDICARE

## 2023-10-30 VITALS
DIASTOLIC BLOOD PRESSURE: 76 MMHG | WEIGHT: 132.8 LBS | HEART RATE: 74 BPM | BODY MASS INDEX: 22.67 KG/M2 | OXYGEN SATURATION: 99 % | SYSTOLIC BLOOD PRESSURE: 128 MMHG | RESPIRATION RATE: 18 BRPM | HEIGHT: 64 IN

## 2023-10-30 DIAGNOSIS — Z00.00 MEDICARE ANNUAL WELLNESS VISIT, SUBSEQUENT: Primary | ICD-10-CM

## 2023-10-30 DIAGNOSIS — M70.62 TROCHANTERIC BURSITIS OF LEFT HIP: ICD-10-CM

## 2023-10-30 DIAGNOSIS — R49.0 DYSPHONIA: ICD-10-CM

## 2023-10-30 DIAGNOSIS — N87.9 CERVICAL DYSPLASIA: ICD-10-CM

## 2023-10-30 DIAGNOSIS — E55.9 VITAMIN D DEFICIENCY, UNSPECIFIED: ICD-10-CM

## 2023-10-30 DIAGNOSIS — G56.03 BILATERAL CARPAL TUNNEL SYNDROME: ICD-10-CM

## 2023-10-30 DIAGNOSIS — M85.88 OTHER SPECIFIED DISORDERS OF BONE DENSITY AND STRUCTURE, OTHER SITE: ICD-10-CM

## 2023-10-30 DIAGNOSIS — I10 PRIMARY HYPERTENSION: ICD-10-CM

## 2023-10-30 DIAGNOSIS — F41.1 GENERALIZED ANXIETY DISORDER: ICD-10-CM

## 2023-10-30 PROBLEM — R07.9 CHEST PAIN: Status: RESOLVED | Noted: 2017-04-07 | Resolved: 2023-10-30

## 2023-10-30 PROCEDURE — 1159F MED LIST DOCD IN RCRD: CPT | Performed by: FAMILY MEDICINE

## 2023-10-30 PROCEDURE — 1170F FXNL STATUS ASSESSED: CPT | Performed by: FAMILY MEDICINE

## 2023-10-30 PROCEDURE — 1160F RVW MEDS BY RX/DR IN RCRD: CPT | Performed by: FAMILY MEDICINE

## 2023-10-30 PROCEDURE — 3078F DIAST BP <80 MM HG: CPT | Performed by: FAMILY MEDICINE

## 2023-10-30 PROCEDURE — 3074F SYST BP LT 130 MM HG: CPT | Performed by: FAMILY MEDICINE

## 2023-10-30 PROCEDURE — G0439 PPPS, SUBSEQ VISIT: HCPCS | Performed by: FAMILY MEDICINE

## 2023-10-30 RX ORDER — CYCLOBENZAPRINE HCL 10 MG
10 TABLET ORAL 2 TIMES DAILY PRN
Qty: 60 TABLET | Refills: 2 | Status: SHIPPED | OUTPATIENT
Start: 2023-10-30

## 2023-10-30 RX ORDER — MELOXICAM 7.5 MG/1
7.5 TABLET ORAL DAILY
Qty: 90 TABLET | Refills: 1 | Status: SHIPPED | OUTPATIENT
Start: 2023-10-30

## 2023-10-30 NOTE — PROGRESS NOTES
The ABCs of the Annual Wellness Visit  Subsequent Medicare Wellness Visit    Subjective    Mehnaz Raines is a 67 y.o. female who presents for a Subsequent Medicare Wellness Visit.    The following portions of the patient's history were reviewed and   updated as appropriate: allergies, current medications, past family history, past medical history, past social history, past surgical history, and problem list.    Compared to one year ago, the patient feels her physical   health is the same.    Compared to one year ago, the patient feels her mental   health is the same.    Recent Hospitalizations:  She was not admitted to the hospital during the last year.     Current Medical Providers:  Patient Care Team:  Abhishek Maynard MD as PCP - General (Internal Medicine)    Outpatient Medications Prior to Visit   Medication Sig Dispense Refill    Ascorbic Acid (VITAMIN C PO) Take  by mouth.      aspirin 81 MG chewable tablet Chew 1 tablet Daily.      Calcium Carbonate-Vitamin D (CALCIUM-D PO) Take  by mouth.      hydroCHLOROthiazide (HYDRODIURIL) 25 MG tablet TAKE 1 TABLET BY MOUTH EVERY DAY 90 tablet 1    LORazepam (ATIVAN) 0.5 MG tablet Take 1 tablet by mouth 2 (Two) Times a Day As Needed for Anxiety. 50 tablet 0    Multiple Vitamins-Minerals (ZINC PO) Take  by mouth.      primidone (MYSOLINE) 50 MG tablet TAKE 1 TABLET BY MOUTH TWICE A  tablet 3    Pyridoxine HCl (VITAMIN B6 PO) Take  by mouth.      sertraline (Zoloft) 25 MG tablet Take 1 tablet by mouth Daily. 90 tablet 1    Vitamin D, Cholecalciferol, (CHOLECALCIFEROL) 10 MCG (400 UNIT) tablet Take 1 tablet by mouth Daily.      cyclobenzaprine (FLEXERIL) 10 MG tablet Take 1 tablet by mouth 2 (Two) Times a Day As Needed for Muscle Spasms.      meloxicam (MOBIC) 7.5 MG tablet Take 1 tablet by mouth Daily.      diphenhydrAMINE (BENADRYL) 25 mg capsule Take 1 capsule by mouth Every 6 (Six) Hours As Needed for Itching. (Patient not taking: Reported on 5/24/2023)  "     Probiotic Product (PROBIOTIC-10 PO) Take  by mouth. (Patient not taking: Reported on 5/24/2023)      triamcinolone (KENALOG) 0.1 % cream PLEASE SEE ATTACHED FOR DETAILED DIRECTIONS (Patient not taking: Reported on 5/24/2023)       No facility-administered medications prior to visit.     No opioid medication identified on active medication list. I have reviewed chart for other potential  high risk medication/s and harmful drug interactions in the elderly.      Aspirin is on active medication list. Aspirin use is indicated based on review of current medical condition/s. Pros and cons of this therapy have been discussed today. Benefits of this medication outweigh potential harm.  Patient has been encouraged to continue taking this medication.  .    Patient Active Problem List   Diagnosis    Dysphonia    Family history of diabetes mellitus    Gastroenteritis    Generalized anxiety disorder    Hyperlipidemia    Hypertension    Depression    Acquired night blindness    Vitreous degeneration    Osteoarthritis    Cervical radiculopathy    Cervical dysplasia    Bilateral carpal tunnel syndrome    Trochanteric bursitis of left hip     Advance Care Planning   Advance Care Planning     Advance Directive is on file.  ACP discussion was held with the patient during this visit. Patient has an advance directive in EMR which is still valid.      Objective    Vitals:    10/30/23 1520   BP: 128/76   Pulse: 74   Resp: 18   SpO2: 99%   Weight: 60.2 kg (132 lb 12.8 oz)   Height: 162.6 cm (64\")     Estimated body mass index is 22.8 kg/m² as calculated from the following:    Height as of this encounter: 162.6 cm (64\").    Weight as of this encounter: 60.2 kg (132 lb 12.8 oz).    BMI is within normal parameters. No other follow-up for BMI required.    Does the patient have evidence of cognitive impairment? No        HEALTH RISK ASSESSMENT    Smoking Status:  Social History     Tobacco Use   Smoking Status Never    Passive exposure: " Never   Smokeless Tobacco Never     Alcohol Consumption:  Social History     Substance and Sexual Activity   Alcohol Use No     Fall Risk Screen:  NEHAADI Fall Risk Assessment was completed, and patient is at LOW risk for falls.Assessment completed on:10/30/2023    Depression Screening:      10/30/2023     3:19 PM   PHQ-2/PHQ-9 Depression Screening   Little Interest or Pleasure in Doing Things 0-->not at all   Feeling Down, Depressed or Hopeless 0-->not at all   PHQ-9: Brief Depression Severity Measure Score 0   Maintained on Zoloft 25mg and this is doing reasonably well    Health Habits and Functional and Cognitive Screening:      10/30/2023     3:19 PM   Functional & Cognitive Status   Do you have difficulty preparing food and eating? No   Do you have difficulty bathing yourself, getting dressed or grooming yourself? No   Do you have difficulty using the toilet? No   Do you have difficulty moving around from place to place? No   Do you have trouble with steps or getting out of a bed or a chair? No   Current Diet Well Balanced Diet   Dental Exam Up to date   Eye Exam Up to date   Exercise (times per week) 4 times per week   Current Exercises Include Walking   Do you need help using the phone?  No   Are you deaf or do you have serious difficulty hearing?  No   Do you need help to go to places out of walking distance? No   Do you need help shopping? No   Do you need help preparing meals?  No   Do you need help with housework?  No   Do you need help with laundry? No   Do you need help taking your medications? No   Do you need help managing money? No   Do you ever drive or ride in a car without wearing a seat belt? No   Have you felt unusual stress, anger or loneliness in the last month? No   Who do you live with? Spouse   If you need help, do you have trouble finding someone available to you? No   Do you have difficulty concentrating, remembering or making decisions? No     Age-appropriate Screening Schedule:  Refer to  the list below for future screening recommendations based on patient's age, sex and/or medical conditions. Orders for these recommended tests are listed in the plan section. The patient has been provided with a written plan.    Health Maintenance   Topic Date Due    COVID-19 Vaccine (1) Never done    Pneumococcal Vaccine 65+ (2 - PCV) 09/01/2021    DXA SCAN  09/10/2022    INFLUENZA VACCINE  08/01/2023    LIPID PANEL  10/05/2023    MAMMOGRAM  09/12/2024    ANNUAL WELLNESS VISIT  10/30/2024    TDAP/TD VACCINES (3 - Td or Tdap) 05/06/2030    COLORECTAL CANCER SCREENING  06/14/2032    HEPATITIS C SCREENING  Completed    ZOSTER VACCINE  Completed        CMS Preventative Services Quick Reference  Risk Factors Identified During Encounter  Immunizations Discussed/Encouraged: Influenza and Prevnar 20 (Pneumococcal 20-valent conjugate)  The above risks/problems have been discussed with the patient.  Pertinent information has been shared with the patient in the After Visit Summary.  An After Visit Summary and PPPS were made available to the patient.    Preventative:   Colonoscopy: 6/2022, due 6/2029  Mammogram: 9/2022- follows w/ Alfredo  Pap smear: 9/2022- c/w cervical cancer. S/p hysterectomy. Follows w/ Alfredo  DEXA: 9/2020- normal. Ordered today  Shingles: Shingrix 2/2020, Zostavax 10/2015  Pneumonia: Pneumovax 9/2020, PCV20 deferred  Tdap: 5/2020  Influenza: 10/2021, recommended- deferred  COVID: No shots, had illness twice    Follow Up:   Next Medicare Wellness visit to be scheduled in 1 year.       Additional E&M Note during same encounter follows:  Patient has multiple medical problems which are significant and separately identifiable that require additional work above and beyond the Medicare Wellness Visit.      Chief Complaint  Medicare Wellness-subsequent    Subjective        HPI  Mehnaz Raines is also being seen today for multiple medical problems    HTN: 128/76 today. Maintained HCTZ 25mg daily. No LH/dizziness, CP  "or SOB     Anxiety/depression: maintained on Zoloft 25mg daily. Only requiring Ativan 0.5mg about 1x/month. Exacerbated by  screaming and having to care for her 's father as well. Happy w/ current control     Vocal cord tremor: maintained on primidone 50 BID. This provides good relief. Happy w/ current control.      Cervical dysplasia: diagnosed 9/2022 after pap concerning for squamous cell carcinoma. Underwent cold cone resection and pathology c/w severe dysplasia (CIERA II-III). Now s/p hysterectomy 10/2022. Repeat pelvic exam 3 months later reassuring. No further treatment planned. Will have pelvic exam w/ Alfredo 1/2024.      L hip pain: patient reports L hip pain for the last week. Worse w/ standing on 1 leg and certain exercises. She has recently started back on meloxicam 7.5mg daily     Hand numbness: patient reports bilateral hand numbness, R>L. She reports continuous numbness to R 3rd and 4th digits. She is now following w/ hand surgery and getting injections (mostly in R wrist) every 4 months. Maintained meloxicam 7.5 daily and wrist immobilizers at night.    Review of Systems   Constitutional:  Negative for chills and fever.   HENT:  Negative for congestion and rhinorrhea.    Eyes:  Negative for visual disturbance.   Respiratory:  Negative for cough and shortness of breath.    Cardiovascular:  Negative for chest pain and palpitations.   Gastrointestinal:  Negative for abdominal pain and vomiting.   Genitourinary:  Negative for difficulty urinating and dysuria.   Musculoskeletal:  Negative for arthralgias and back pain.   Skin:  Negative for rash.   Neurological:  Negative for dizziness and light-headedness.   Psychiatric/Behavioral:  Negative for dysphoric mood and sleep disturbance. The patient is not nervous/anxious.        Objective   Vital Signs:  /76   Pulse 74   Resp 18   Ht 162.6 cm (64\")   Wt 60.2 kg (132 lb 12.8 oz)   SpO2 99%   BMI 22.80 kg/m²     Physical " Exam  Constitutional:       General: She is not in acute distress.     Appearance: She is well-developed.   HENT:      Head: Normocephalic and atraumatic.      Right Ear: Tympanic membrane and external ear normal. There is no impacted cerumen.      Left Ear: Tympanic membrane and external ear normal. There is no impacted cerumen.      Nose: Nose normal.      Mouth/Throat:      Mouth: Mucous membranes are moist.      Pharynx: No oropharyngeal exudate or posterior oropharyngeal erythema.   Eyes:      General: No scleral icterus.        Right eye: No discharge.         Left eye: No discharge.      Extraocular Movements: Extraocular movements intact.      Conjunctiva/sclera: Conjunctivae normal.      Pupils: Pupils are equal, round, and reactive to light.   Neck:      Thyroid: No thyromegaly.      Vascular: No carotid bruit.   Cardiovascular:      Rate and Rhythm: Normal rate and regular rhythm.      Heart sounds: Normal heart sounds. No murmur heard.  Pulmonary:      Effort: Pulmonary effort is normal. No respiratory distress.      Breath sounds: Normal breath sounds. No wheezing or rales.   Abdominal:      General: Bowel sounds are normal. There is no distension.      Palpations: Abdomen is soft.      Tenderness: There is no abdominal tenderness.   Musculoskeletal:         General: No deformity. Normal range of motion.      Cervical back: Normal range of motion and neck supple.   Lymphadenopathy:      Cervical: No cervical adenopathy.   Skin:     General: Skin is warm and dry.      Findings: No rash.   Neurological:      General: No focal deficit present.      Mental Status: She is alert and oriented to person, place, and time. Mental status is at baseline.      Cranial Nerves: No cranial nerve deficit.      Sensory: No sensory deficit.   Psychiatric:         Behavior: Behavior normal.         Thought Content: Thought content normal.             Assessment and Plan   Diagnoses and all orders for this visit:    1.  Medicare annual wellness visit, subsequent (Primary)  -     CBC & Differential  -     Comprehensive Metabolic Panel  -     Hemoglobin A1c  -     Lipid Panel  -     TSH  -     T4, Free  -     Vitamin D,25-Hydroxy  -     Vitamin B12  -     DEXA Bone Density Axial; Future  -  Counseled regarding diet, exercise, weight loss, and preventative health maintenance items/immunizations below    2. Primary hypertension: 128/76 today  -     Comprehensive Metabolic Panel  - Cont home HCTZ 25 daily    3. Generalized anxiety disorder: reasonably controlled on current regimen   - Cont home Zoloft 25mg daily w/ rare us of Ativan PRN    4. Dysphonia: vocal cord tremor   - Cont home primidone 50mg BID    5. Cervical dysplasia: s/p hysterectomy in 2022   - Cont GYN f/u- [facility]    6. Trochanteric bursitis of left hip  -     Cont meloxicam (MOBIC) 7.5 MG tablet; Take 1 tablet by mouth Daily.  Dispense: 90 tablet; Refill: 1  -     cyclobenzaprine (FLEXERIL) 10 MG tablet; Take 1 tablet by mouth 2 (Two) Times a Day As Needed for Muscle Spasms.  Dispense: 60 tablet; Refill: 2  - Handout w/ exercises given today  - Consider steroid injection if not improving    7. Bilateral carpal tunnel syndrome   - Cont home meloxicam and wrist immobilizers   - Cont hand surgery f/u w/ injections per hand surgery    8. Vitamin D deficiency, unspecified  -     Vitamin D,25-Hydroxy    9. Other specified disorders of bone density and structure, other site  -     DEXA Bone Density Axial; Future       Follow Up   Return in about 1 year (around 10/30/2024) for Medicare Wellness.  Patient was given instructions and counseling regarding her condition or for health maintenance advice. Please see specific information pulled into the AVS if appropriate.

## 2023-10-31 ENCOUNTER — LAB (OUTPATIENT)
Dept: FAMILY MEDICINE CLINIC | Facility: CLINIC | Age: 68
End: 2023-10-31
Payer: MEDICARE

## 2023-10-31 LAB
25(OH)D3 SERPL-MCNC: 45.2 NG/ML (ref 30–100)
ALBUMIN SERPL-MCNC: 4.5 G/DL (ref 3.5–5.2)
ALBUMIN/GLOB SERPL: 2 G/DL
ALP SERPL-CCNC: 95 U/L (ref 39–117)
ALT SERPL W P-5'-P-CCNC: 16 U/L (ref 1–33)
ANION GAP SERPL CALCULATED.3IONS-SCNC: 9 MMOL/L (ref 5–15)
AST SERPL-CCNC: 28 U/L (ref 1–32)
BASOPHILS # BLD AUTO: 0.07 10*3/MM3 (ref 0–0.2)
BASOPHILS NFR BLD AUTO: 1.3 % (ref 0–1.5)
BILIRUB SERPL-MCNC: 0.3 MG/DL (ref 0–1.2)
BUN SERPL-MCNC: 12 MG/DL (ref 8–23)
BUN/CREAT SERPL: 21.4 (ref 7–25)
CALCIUM SPEC-SCNC: 9.4 MG/DL (ref 8.6–10.5)
CHLORIDE SERPL-SCNC: 99 MMOL/L (ref 98–107)
CHOLEST SERPL-MCNC: 239 MG/DL (ref 0–200)
CO2 SERPL-SCNC: 29 MMOL/L (ref 22–29)
CREAT SERPL-MCNC: 0.56 MG/DL (ref 0.57–1)
DEPRECATED RDW RBC AUTO: 42.7 FL (ref 37–54)
EGFRCR SERPLBLD CKD-EPI 2021: 99.6 ML/MIN/1.73
EOSINOPHIL # BLD AUTO: 0.26 10*3/MM3 (ref 0–0.4)
EOSINOPHIL NFR BLD AUTO: 4.7 % (ref 0.3–6.2)
ERYTHROCYTE [DISTWIDTH] IN BLOOD BY AUTOMATED COUNT: 12.6 % (ref 12.3–15.4)
GLOBULIN UR ELPH-MCNC: 2.3 GM/DL
GLUCOSE SERPL-MCNC: 88 MG/DL (ref 65–99)
HBA1C MFR BLD: 5.5 % (ref 4.8–5.6)
HCT VFR BLD AUTO: 38.5 % (ref 34–46.6)
HDLC SERPL-MCNC: 74 MG/DL (ref 40–60)
HGB BLD-MCNC: 13.7 G/DL (ref 12–15.9)
IMM GRANULOCYTES # BLD AUTO: 0.02 10*3/MM3 (ref 0–0.05)
IMM GRANULOCYTES NFR BLD AUTO: 0.4 % (ref 0–0.5)
LDLC SERPL CALC-MCNC: 149 MG/DL (ref 0–100)
LDLC/HDLC SERPL: 1.98 {RATIO}
LYMPHOCYTES # BLD AUTO: 1.77 10*3/MM3 (ref 0.7–3.1)
LYMPHOCYTES NFR BLD AUTO: 31.8 % (ref 19.6–45.3)
MCH RBC QN AUTO: 33.2 PG (ref 26.6–33)
MCHC RBC AUTO-ENTMCNC: 35.6 G/DL (ref 31.5–35.7)
MCV RBC AUTO: 93.2 FL (ref 79–97)
MONOCYTES # BLD AUTO: 0.62 10*3/MM3 (ref 0.1–0.9)
MONOCYTES NFR BLD AUTO: 11.1 % (ref 5–12)
NEUTROPHILS NFR BLD AUTO: 2.83 10*3/MM3 (ref 1.7–7)
NEUTROPHILS NFR BLD AUTO: 50.7 % (ref 42.7–76)
NRBC BLD AUTO-RTO: 0 /100 WBC (ref 0–0.2)
PLATELET # BLD AUTO: 296 10*3/MM3 (ref 140–450)
PMV BLD AUTO: 9.8 FL (ref 6–12)
POTASSIUM SERPL-SCNC: 4 MMOL/L (ref 3.5–5.2)
PROT SERPL-MCNC: 6.8 G/DL (ref 6–8.5)
RBC # BLD AUTO: 4.13 10*6/MM3 (ref 3.77–5.28)
SODIUM SERPL-SCNC: 137 MMOL/L (ref 136–145)
T4 FREE SERPL-MCNC: 1.03 NG/DL (ref 0.93–1.7)
TRIGL SERPL-MCNC: 94 MG/DL (ref 0–150)
TSH SERPL DL<=0.05 MIU/L-ACNC: 2.39 UIU/ML (ref 0.27–4.2)
VIT B12 BLD-MCNC: 297 PG/ML (ref 211–946)
VLDLC SERPL-MCNC: 16 MG/DL (ref 5–40)
WBC NRBC COR # BLD: 5.57 10*3/MM3 (ref 3.4–10.8)

## 2023-10-31 PROCEDURE — 82306 VITAMIN D 25 HYDROXY: CPT | Performed by: FAMILY MEDICINE

## 2023-10-31 PROCEDURE — 80053 COMPREHEN METABOLIC PANEL: CPT | Performed by: FAMILY MEDICINE

## 2023-10-31 PROCEDURE — 83036 HEMOGLOBIN GLYCOSYLATED A1C: CPT | Performed by: FAMILY MEDICINE

## 2023-10-31 PROCEDURE — 36415 COLL VENOUS BLD VENIPUNCTURE: CPT | Performed by: FAMILY MEDICINE

## 2023-10-31 PROCEDURE — 84443 ASSAY THYROID STIM HORMONE: CPT | Performed by: FAMILY MEDICINE

## 2023-10-31 PROCEDURE — 84439 ASSAY OF FREE THYROXINE: CPT | Performed by: FAMILY MEDICINE

## 2023-10-31 PROCEDURE — 85025 COMPLETE CBC W/AUTO DIFF WBC: CPT | Performed by: FAMILY MEDICINE

## 2023-10-31 PROCEDURE — 80061 LIPID PANEL: CPT | Performed by: FAMILY MEDICINE

## 2023-10-31 PROCEDURE — 82607 VITAMIN B-12: CPT | Performed by: FAMILY MEDICINE

## 2023-11-01 ENCOUNTER — HOSPITAL ENCOUNTER (OUTPATIENT)
Dept: MAMMOGRAPHY | Facility: HOSPITAL | Age: 68
Discharge: HOME | End: 2023-11-01
Admitting: OBSTETRICS & GYNECOLOGY
Payer: MEDICARE

## 2023-11-01 DIAGNOSIS — Z12.31 VISIT FOR SCREENING MAMMOGRAM: ICD-10-CM

## 2023-11-01 PROCEDURE — 77067 SCR MAMMO BI INCL CAD: CPT

## 2023-11-01 PROCEDURE — 77063 BREAST TOMOSYNTHESIS BI: CPT

## 2023-11-01 RX ORDER — ATORVASTATIN CALCIUM 20 MG/1
20 TABLET, FILM COATED ORAL DAILY
Qty: 90 TABLET | Refills: 3 | Status: SHIPPED | OUTPATIENT
Start: 2023-11-01

## 2023-11-15 ENCOUNTER — HOSPITAL ENCOUNTER (OUTPATIENT)
Dept: BONE DENSITY | Facility: HOSPITAL | Age: 68
Discharge: HOME | End: 2023-11-15
Admitting: FAMILY MEDICINE
Payer: MEDICARE

## 2023-11-15 DIAGNOSIS — Z00.00 MEDICARE ANNUAL WELLNESS VISIT, SUBSEQUENT: ICD-10-CM

## 2023-11-15 DIAGNOSIS — M85.88 OTHER SPECIFIED DISORDERS OF BONE DENSITY AND STRUCTURE, OTHER SITE: ICD-10-CM

## 2023-11-15 PROCEDURE — 77080 DXA BONE DENSITY AXIAL: CPT

## 2023-11-16 DIAGNOSIS — F32.A DEPRESSION, UNSPECIFIED DEPRESSION TYPE: ICD-10-CM

## 2023-11-16 RX ORDER — SERTRALINE HYDROCHLORIDE 25 MG/1
25 TABLET, FILM COATED ORAL DAILY
Qty: 90 TABLET | Refills: 1 | Status: SHIPPED | OUTPATIENT
Start: 2023-11-16

## 2023-11-16 RX ORDER — HYDROCHLOROTHIAZIDE 25 MG/1
TABLET ORAL
Qty: 90 TABLET | Refills: 1 | Status: SHIPPED | OUTPATIENT
Start: 2023-11-16

## 2024-02-28 NOTE — TELEPHONE ENCOUNTER
Continue working with OT, responding to interventions   PT WANTED TO LET DR. HENLEY KNOW THAT HER BLOOD PRESSURE FOR THE LAST FEW NIGHTS HAS BEEN 140-150 ACCOMPANIED BY A HEADACHE.

## 2024-04-29 DIAGNOSIS — M70.62 TROCHANTERIC BURSITIS OF LEFT HIP: ICD-10-CM

## 2024-04-29 RX ORDER — MELOXICAM 7.5 MG/1
7.5 TABLET ORAL DAILY
Qty: 90 TABLET | Refills: 1 | Status: SHIPPED | OUTPATIENT
Start: 2024-04-29

## 2024-05-08 DIAGNOSIS — F32.A DEPRESSION, UNSPECIFIED DEPRESSION TYPE: ICD-10-CM

## 2024-05-08 DIAGNOSIS — R49.0 DYSPHONIA: ICD-10-CM

## 2024-05-08 RX ORDER — SERTRALINE HYDROCHLORIDE 25 MG/1
25 TABLET, FILM COATED ORAL DAILY
Qty: 90 TABLET | Refills: 1 | Status: SHIPPED | OUTPATIENT
Start: 2024-05-08

## 2024-05-08 RX ORDER — HYDROCHLOROTHIAZIDE 25 MG/1
TABLET ORAL
Qty: 90 TABLET | Refills: 1 | Status: SHIPPED | OUTPATIENT
Start: 2024-05-08

## 2024-05-08 RX ORDER — PRIMIDONE 50 MG/1
TABLET ORAL
Qty: 180 TABLET | Refills: 3 | Status: SHIPPED | OUTPATIENT
Start: 2024-05-08

## 2024-08-16 ENCOUNTER — CLINICAL SUPPORT (OUTPATIENT)
Dept: FAMILY MEDICINE CLINIC | Facility: CLINIC | Age: 69
End: 2024-08-16
Payer: MEDICARE

## 2024-08-16 VITALS — DIASTOLIC BLOOD PRESSURE: 64 MMHG | SYSTOLIC BLOOD PRESSURE: 122 MMHG

## 2024-08-16 DIAGNOSIS — I10 PRIMARY HYPERTENSION: Primary | ICD-10-CM

## 2024-09-04 DIAGNOSIS — F32.A DEPRESSION, UNSPECIFIED DEPRESSION TYPE: ICD-10-CM

## 2024-09-04 RX ORDER — SERTRALINE HYDROCHLORIDE 25 MG/1
25 TABLET, FILM COATED ORAL DAILY
Qty: 90 TABLET | Refills: 1 | Status: SHIPPED | OUTPATIENT
Start: 2024-09-04

## 2024-09-04 RX ORDER — HYDROCHLOROTHIAZIDE 25 MG/1
TABLET ORAL
Qty: 90 TABLET | Refills: 1 | Status: SHIPPED | OUTPATIENT
Start: 2024-09-04

## 2024-10-01 ENCOUNTER — HOSPITAL ENCOUNTER (OUTPATIENT)
Dept: CARDIOLOGY | Facility: HOSPITAL | Age: 69
Discharge: HOME | End: 2024-10-01
Payer: MEDICARE

## 2024-10-01 ENCOUNTER — LAB (OUTPATIENT)
Dept: LAB | Facility: HOSPITAL | Age: 69
End: 2024-10-01
Payer: MEDICARE

## 2024-10-01 ENCOUNTER — TRANSCRIBE ORDERS (OUTPATIENT)
Dept: LAB | Facility: HOSPITAL | Age: 69
End: 2024-10-01
Payer: MEDICARE

## 2024-10-01 DIAGNOSIS — I10 ESSENTIAL HYPERTENSION, MALIGNANT: ICD-10-CM

## 2024-10-01 DIAGNOSIS — I10 ESSENTIAL HYPERTENSION, MALIGNANT: Primary | ICD-10-CM

## 2024-10-01 LAB
POTASSIUM SERPL-SCNC: 3.7 MMOL/L (ref 3.5–5.2)
QT INTERVAL: 376 MS
QTC INTERVAL: 412 MS

## 2024-10-01 PROCEDURE — 84132 ASSAY OF SERUM POTASSIUM: CPT

## 2024-10-01 PROCEDURE — 36415 COLL VENOUS BLD VENIPUNCTURE: CPT

## 2024-10-01 PROCEDURE — 93005 ELECTROCARDIOGRAM TRACING: CPT | Performed by: PLASTIC SURGERY

## 2024-10-08 LAB
QT INTERVAL: 376 MS
QTC INTERVAL: 412 MS

## 2024-11-04 ENCOUNTER — OFFICE VISIT (OUTPATIENT)
Dept: FAMILY MEDICINE CLINIC | Facility: CLINIC | Age: 69
End: 2024-11-04
Payer: MEDICARE

## 2024-11-04 VITALS
DIASTOLIC BLOOD PRESSURE: 70 MMHG | HEIGHT: 64 IN | RESPIRATION RATE: 16 BRPM | OXYGEN SATURATION: 97 % | BODY MASS INDEX: 22.06 KG/M2 | HEART RATE: 88 BPM | SYSTOLIC BLOOD PRESSURE: 117 MMHG | WEIGHT: 129.2 LBS

## 2024-11-04 DIAGNOSIS — J38.3 VOCAL CORD DYSFUNCTION: ICD-10-CM

## 2024-11-04 DIAGNOSIS — F32.A DEPRESSION, UNSPECIFIED DEPRESSION TYPE: ICD-10-CM

## 2024-11-04 DIAGNOSIS — M19.041 OSTEOARTHRITIS OF RIGHT HAND, UNSPECIFIED OSTEOARTHRITIS TYPE: ICD-10-CM

## 2024-11-04 DIAGNOSIS — E55.9 VITAMIN D DEFICIENCY, UNSPECIFIED: ICD-10-CM

## 2024-11-04 DIAGNOSIS — E78.5 HYPERLIPIDEMIA, UNSPECIFIED HYPERLIPIDEMIA TYPE: ICD-10-CM

## 2024-11-04 DIAGNOSIS — Z00.00 MEDICARE ANNUAL WELLNESS VISIT, SUBSEQUENT: Primary | ICD-10-CM

## 2024-11-04 DIAGNOSIS — I10 PRIMARY HYPERTENSION: ICD-10-CM

## 2024-11-04 DIAGNOSIS — Z12.31 ENCOUNTER FOR SCREENING MAMMOGRAM FOR MALIGNANT NEOPLASM OF BREAST: ICD-10-CM

## 2024-11-04 DIAGNOSIS — N87.9 CERVICAL DYSPLASIA: ICD-10-CM

## 2024-11-04 PROCEDURE — 99213 OFFICE O/P EST LOW 20 MIN: CPT | Performed by: FAMILY MEDICINE

## 2024-11-04 PROCEDURE — G0439 PPPS, SUBSEQ VISIT: HCPCS | Performed by: FAMILY MEDICINE

## 2024-11-04 PROCEDURE — 3074F SYST BP LT 130 MM HG: CPT | Performed by: FAMILY MEDICINE

## 2024-11-04 PROCEDURE — 3078F DIAST BP <80 MM HG: CPT | Performed by: FAMILY MEDICINE

## 2024-11-04 PROCEDURE — 1159F MED LIST DOCD IN RCRD: CPT | Performed by: FAMILY MEDICINE

## 2024-11-04 PROCEDURE — 1126F AMNT PAIN NOTED NONE PRSNT: CPT | Performed by: FAMILY MEDICINE

## 2024-11-04 PROCEDURE — 1160F RVW MEDS BY RX/DR IN RCRD: CPT | Performed by: FAMILY MEDICINE

## 2024-11-04 NOTE — PROGRESS NOTES
Subjective   The ABCs of the Annual Wellness Visit  Medicare Wellness Visit    Mehnaz Raines is a 68 y.o. patient who presents for a Medicare Wellness Visit.    The following portions of the patient's history were reviewed and   updated as appropriate: allergies, current medications, past family history, past medical history, past social history, past surgical history, and problem list.    Compared to one year ago, the patient's physical   health is the same.  Compared to one year ago, the patient's mental   health is the same.    Recent Hospitalizations:  She was not admitted to the hospital during the last year.     Current Medical Providers:  Patient Care Team:  Abhishek Maynard MD as PCP - General (Internal Medicine)    Outpatient Medications Prior to Visit   Medication Sig Dispense Refill    Ascorbic Acid (VITAMIN C PO) Take  by mouth.      aspirin 81 MG chewable tablet Chew 1 tablet Daily.      Calcium Carbonate-Vitamin D (CALCIUM-D PO) Take  by mouth.      cyclobenzaprine (FLEXERIL) 10 MG tablet Take 1 tablet by mouth 2 (Two) Times a Day As Needed for Muscle Spasms. 60 tablet 2    hydroCHLOROthiazide 25 MG tablet TAKE 1 TABLET BY MOUTH EVERY DAY 90 tablet 1    LORazepam (ATIVAN) 0.5 MG tablet Take 1 tablet by mouth 2 (Two) Times a Day As Needed for Anxiety. 50 tablet 0    Magnesium 250 MG capsule Take  by mouth.      meloxicam (MOBIC) 7.5 MG tablet TAKE 1 TABLET BY MOUTH EVERY DAY 90 tablet 1    Multiple Vitamins-Minerals (ZINC PO) Take  by mouth.      primidone (MYSOLINE) 50 MG tablet TAKE 1 TABLET BY MOUTH TWICE A  tablet 3    Pyridoxine HCl (VITAMIN B6 PO) Take  by mouth.      sertraline (ZOLOFT) 25 MG tablet TAKE 1 TABLET BY MOUTH EVERY DAY 90 tablet 1    Vitamin D, Cholecalciferol, (CHOLECALCIFEROL) 10 MCG (400 UNIT) tablet Take 1 tablet by mouth Daily.      atorvastatin (LIPITOR) 20 MG tablet Take 1 tablet by mouth Daily. 90 tablet 3     No facility-administered medications prior to  "visit.     No opioid medication identified on active medication list. I have reviewed chart for other potential  high risk medication/s and harmful drug interactions in the elderly.      Aspirin is on active medication list. Aspirin use is indicated based on review of current medical condition/s. Pros and cons of this therapy have been discussed today. Benefits of this medication outweigh potential harm.  Patient has been encouraged to continue taking this medication.  .    Patient Active Problem List   Diagnosis    Dysphonia    Family history of diabetes mellitus    Gastroenteritis    Generalized anxiety disorder    Hyperlipidemia    Hypertension    Depression    Acquired night blindness    Vitreous degeneration    Osteoarthritis    Cervical radiculopathy    Cervical dysplasia    Bilateral carpal tunnel syndrome    Trochanteric bursitis of left hip     Advance Care Planning Advance Directive is on file.  ACP discussion was held with the patient during this visit. Patient has an advance directive in EMR which is still valid.       Objective   Vitals:    11/04/24 1340   BP: 117/70   Pulse: 88   Resp: 16   SpO2: 97%   Weight: 58.6 kg (129 lb 3.2 oz)   Height: 162.6 cm (64\")   PainSc: 0-No pain     Estimated body mass index is 22.18 kg/m² as calculated from the following:    Height as of this encounter: 162.6 cm (64\").    Weight as of this encounter: 58.6 kg (129 lb 3.2 oz).    BMI is within normal parameters. No other follow-up for BMI required.    Does the patient have evidence of cognitive impairment? No                                                                                             Health  Risk Assessment    Smoking Status:  Social History     Tobacco Use   Smoking Status Never    Passive exposure: Never   Smokeless Tobacco Never     Alcohol Consumption:  Social History     Substance and Sexual Activity   Alcohol Use No     Fall Risk Screen  STEADI Fall Risk Assessment was completed, and patient is at " HIGH risk for falls. Assessment completed on:2024  Single fall in the last year- slipped on wet grass    Depression Screenin/4/2024     1:39 PM   PHQ-2/PHQ-9 Depression Screening   Little interest or pleasure in doing things Not at all   Feeling down, depressed, or hopeless Several days   Maintained on Zoloft 25mg daily. Exacerbated recently by L thumb surgery- has made her feel helpless and not able to care for herself. Surgery was 10/15/24    Health Habits and Functional and Cognitive Screening:      10/28/2024     9:53 AM   Functional & Cognitive Status   Do you have difficulty preparing food and eating? No    Do you have difficulty bathing yourself, getting dressed or grooming yourself? Yes    Do you have difficulty using the toilet? No    Do you have difficulty moving around from place to place? No    Do you have trouble with steps or getting out of a bed or a chair? No    Current Diet Frequent Junk Food    Dental Exam Up to date    Eye Exam Up to date    Exercise (times per week) 5 times per week    Current Exercises Include Walking    Do you need help using the phone?  No    Are you deaf or do you have serious difficulty hearing?  No    Do you need help to go to places out of walking distance? No    Do you need help shopping? No    Do you need help preparing meals?  Yes    Do you need help with housework?  Yes    Do you need help with laundry? No    Do you need help taking your medications? No    Do you need help managing money? No    Do you ever drive or ride in a car without wearing a seat belt? No    Have you felt unusual stress, anger or loneliness in the last month? No    Who do you live with? Spouse    If you need help, do you have trouble finding someone available to you? No    Have you been bothered in the last four weeks by sexual problems? No    Do you have difficulty concentrating, remembering or making decisions? No        Patient-reported         Mild difficulty recently related to  L CMC surgery    Age-appropriate Screening Schedule:  Refer to the list below for future screening recommendations based on patient's age, sex and/or medical conditions. Orders for these recommended tests are listed in the plan section. The patient has been provided with a written plan.    Health Maintenance List  Health Maintenance   Topic Date Due    Pneumococcal Vaccine 65+ (2 of 2 - PCV) 09/01/2021    LIPID PANEL  10/31/2024    INFLUENZA VACCINE  03/31/2025 (Originally 8/1/2024)    COVID-19 Vaccine (1 - 2024-25 season) 11/04/2025 (Originally 9/1/2024)    MAMMOGRAM  11/01/2025    ANNUAL WELLNESS VISIT  11/04/2025    DXA SCAN  11/15/2025    TDAP/TD VACCINES (3 - Td or Tdap) 05/06/2030    COLORECTAL CANCER SCREENING  06/14/2032    HEPATITIS C SCREENING  Completed    ZOSTER VACCINE  Completed                                                                                                                                              CMS Preventative Services Quick Reference  Risk Factors Identified During Encounter  Immunizations Discussed/Encouraged: Influenza    The above risks/problems have been discussed with the patient.  Pertinent information has been shared with the patient in the After Visit Summary.  An After Visit Summary and PPPS were made available to the patient.    Preventative:   Colonoscopy: 6/2022, due 6/2029  Mammogram: 11/2023- follows w/ GYN (Alfredo). Ordered today  Pap smear: 9/2022- c/w cervical cancer. S/p hysterectomy in 2022. Follows w/ Alfredo  DEXA: 11/2023- osteopenia. Maintained on Ca/VitD daily  Shingles: Shingrix 2/2020, Zostavax 10/2015  Pneumonia: Pneumovax 9/2020, PCV20 deferred  Tdap: 5/2020  Influenza: 10/2021, recommended- deferred  COVID: No shots, had illness twice    Follow Up:   Next Medicare Wellness visit to be scheduled in 1 year.    Additional E&M Note during same encounter follows:  Patient has additional, significant, and separately identifiable condition(s)/problem(s) that  "require work above and beyond the Medicare Wellness Visit     Chief Complaint  Medicare Wellness-subsequent    Subjective   HPI  Mehnaz is also being seen today for additional medical problem/s.        HTN: 117/70 today. Maintained HCTZ 25mg daily. No LH (intermittent vertigo), CP or SOB     HLD: atorvastatin ordered after last visit due to total cholesterol 239 and . Patient opted not to take atorvastatin. 6/2023 calcium score 0    Anxiety/depression: exacerbated by recent thumb surgery. Maintained on Zoloft 25mg daily. Only requiring Ativan 0.5mg about 1x/month. Exacerbated by  screaming and having to care for her 's father as well. Happy w/ current control     Vocal cord tremor: maintained on primidone 50 BID. This provides good relief. Exacerbated by cold and exercise. Satisfied w/ current control. Patient reports choking easily- cuts meat very small. Ongoing for years but progressive     Cervical dysplasia: diagnosed 9/2022 after pap concerning for squamous cell carcinoma. Underwent cold cone resection and pathology c/w severe dysplasia (CIERA II-III). Now s/p hysterectomy 10/2022. Repeat pelvic exam 3 months later reassuring. No further treatment planned. 1/2024 pap normal. Has another scheduled for 2/2024     OA: s/p L CMC surgery due to advanced arthritis as was no longer receiving benefit from steroid injection. Continues to follow w/ hand surgery. Maintained on meloxicam 7.5 daily PRN    Objective   Vital Signs:  /70   Pulse 88   Resp 16   Ht 162.6 cm (64\")   Wt 58.6 kg (129 lb 3.2 oz)   SpO2 97%   BMI 22.18 kg/m²     Physical Exam  Constitutional:       General: She is not in acute distress.     Appearance: She is well-developed.   HENT:      Head: Normocephalic and atraumatic.      Right Ear: Tympanic membrane and external ear normal. There is no impacted cerumen.      Left Ear: Tympanic membrane and external ear normal. There is no impacted cerumen.      Nose: Nose normal. "      Mouth/Throat:      Mouth: Mucous membranes are moist.      Pharynx: No oropharyngeal exudate or posterior oropharyngeal erythema.   Eyes:      General: No scleral icterus.        Right eye: No discharge.         Left eye: No discharge.      Extraocular Movements: Extraocular movements intact.      Conjunctiva/sclera: Conjunctivae normal.      Pupils: Pupils are equal, round, and reactive to light.   Neck:      Thyroid: No thyromegaly.      Vascular: No carotid bruit.   Cardiovascular:      Rate and Rhythm: Normal rate and regular rhythm.      Heart sounds: Normal heart sounds. No murmur heard.  Pulmonary:      Effort: Pulmonary effort is normal. No respiratory distress.      Breath sounds: Normal breath sounds. No wheezing or rales.   Abdominal:      General: Bowel sounds are normal. There is no distension.      Palpations: Abdomen is soft.      Tenderness: There is no abdominal tenderness.   Musculoskeletal:         General: No deformity.      Cervical back: Normal range of motion and neck supple.      Comments: L wrist immobilizer in place   Lymphadenopathy:      Cervical: No cervical adenopathy.   Skin:     General: Skin is warm and dry.      Findings: No rash.   Neurological:      General: No focal deficit present.      Mental Status: She is alert and oriented to person, place, and time. Mental status is at baseline.      Cranial Nerves: No cranial nerve deficit.      Sensory: No sensory deficit.   Psychiatric:         Behavior: Behavior normal.         Thought Content: Thought content normal.             Assessment and Plan Additional age appropriate preventative wellness advice topics were discussed during today's preventative wellness exam(some topics already addressed during AWV portion of the note above):    Physical Activity: Advised cardiovascular activity 150 minutes per week as tolerated. (example brisk walk for 30 minutes, 5 days a week).     Medicare annual wellness visit, subsequent  - Annual  labs ordered today  - Mammogram ordered  - Counseled regarding exercise and preventative health maintenance items/immunizations below  Primary hypertension  117/70 today  - Cont home HCTZ 25 daily  Hyperlipidemia, unspecified hyperlipidemia type  - Lipid panel today  - Monitor off medication given calcium score 0 and patient preference  Depression, unspecified depression type  - Cont home sertraline 25mg daily w/ lorazepam 0.5mg PRN  Vocal cord dysfunction  - Cont home primidone 50mg BID  - Swallow study ordered given complaints of choking/dysphagia  Cervical dysplasia  S/p hysterectomy in 10/2022. Recent pap normal  - Cont GYN f/u  Osteoarthritis of right hand, unspecified osteoarthritis type  S/p surgery 10/2024  - Cont hand surgery f/u  - Cont meloxicam 7.5 daily  Vitamin D deficiency, unspecified    Encounter for screening mammogram for malignant neoplasm of breast      Orders Placed This Encounter   Procedures    FL Video Swallow With Speech Single Contrast     Standing Status:   Future     Standing Expiration Date:   11/4/2025     Order Specific Question:   Reason for Exam:     Answer:   dysphagia. hx vocal cord dysfunction     Order Specific Question:   Release to patient     Answer:   Routine Release [[id number]]    Mammo Screening Digital Tomosynthesis Bilateral With CAD     Standing Status:   Future     Order Specific Question:   Reason for Exam:     Answer:   screening     Order Specific Question:   Release to patient     Answer:   Routine Release [[id number]]    Comprehensive Metabolic Panel     Order Specific Question:   Release to patient     Answer:   Routine Release [[id number]]    Hemoglobin A1c     Order Specific Question:   Release to patient     Answer:   Routine Release [[id number]]    Lipid Panel     Order Specific Question:   Release to patient     Answer:   Routine Release [[id number]]    TSH     Order Specific Question:   Release to patient     Answer:   Routine Release [[id number]]    T4,  Free     Order Specific Question:   Release to patient     Answer:   Routine Release [[id number]]    Vitamin D,25-Hydroxy     Order Specific Question:   Release to patient     Answer:   Routine Release [[id number]]    Vitamin B12     Order Specific Question:   Release to patient     Answer:   Routine Release [[id number]]    CBC & Differential     Order Specific Question:   Manual Differential     Answer:   No     Order Specific Question:   Release to patient     Answer:   Routine Release [[id number]]             Follow Up   Return in about 1 year (around 11/4/2025) for Medicare Wellness.  Patient was given instructions and counseling regarding her condition or for health maintenance advice. Please see specific information pulled into the AVS if appropriate.

## 2024-11-05 ENCOUNTER — LAB (OUTPATIENT)
Dept: LAB | Facility: HOSPITAL | Age: 69
End: 2024-11-05
Payer: MEDICARE

## 2024-11-05 LAB
25(OH)D3 SERPL-MCNC: 48.2 NG/ML (ref 30–100)
ALBUMIN SERPL-MCNC: 4.2 G/DL (ref 3.5–5.2)
ALBUMIN/GLOB SERPL: 1.6 G/DL
ALP SERPL-CCNC: 93 U/L (ref 39–117)
ALT SERPL W P-5'-P-CCNC: 16 U/L (ref 1–33)
ANION GAP SERPL CALCULATED.3IONS-SCNC: 8.6 MMOL/L (ref 5–15)
AST SERPL-CCNC: 27 U/L (ref 1–32)
BASOPHILS # BLD AUTO: 0.06 10*3/MM3 (ref 0–0.2)
BASOPHILS NFR BLD AUTO: 1.2 % (ref 0–1.5)
BILIRUB SERPL-MCNC: 0.3 MG/DL (ref 0–1.2)
BUN SERPL-MCNC: 13 MG/DL (ref 8–23)
BUN/CREAT SERPL: 20 (ref 7–25)
CALCIUM SPEC-SCNC: 9.4 MG/DL (ref 8.6–10.5)
CHLORIDE SERPL-SCNC: 98 MMOL/L (ref 98–107)
CHOLEST SERPL-MCNC: 219 MG/DL (ref 0–200)
CO2 SERPL-SCNC: 29.4 MMOL/L (ref 22–29)
CREAT SERPL-MCNC: 0.65 MG/DL (ref 0.57–1)
DEPRECATED RDW RBC AUTO: 44.8 FL (ref 37–54)
EGFRCR SERPLBLD CKD-EPI 2021: 95.4 ML/MIN/1.73
EOSINOPHIL # BLD AUTO: 0.18 10*3/MM3 (ref 0–0.4)
EOSINOPHIL NFR BLD AUTO: 3.5 % (ref 0.3–6.2)
ERYTHROCYTE [DISTWIDTH] IN BLOOD BY AUTOMATED COUNT: 12.4 % (ref 12.3–15.4)
GLOBULIN UR ELPH-MCNC: 2.6 GM/DL
GLUCOSE SERPL-MCNC: 85 MG/DL (ref 65–99)
HBA1C MFR BLD: 5.84 % (ref 4.8–5.6)
HCT VFR BLD AUTO: 38.9 % (ref 34–46.6)
HDLC SERPL-MCNC: 63 MG/DL (ref 40–60)
HGB BLD-MCNC: 13.3 G/DL (ref 12–15.9)
IMM GRANULOCYTES # BLD AUTO: 0.02 10*3/MM3 (ref 0–0.05)
IMM GRANULOCYTES NFR BLD AUTO: 0.4 % (ref 0–0.5)
LDLC SERPL CALC-MCNC: 136 MG/DL (ref 0–100)
LDLC/HDLC SERPL: 2.12 {RATIO}
LYMPHOCYTES # BLD AUTO: 1.99 10*3/MM3 (ref 0.7–3.1)
LYMPHOCYTES NFR BLD AUTO: 38.3 % (ref 19.6–45.3)
MCH RBC QN AUTO: 33.3 PG (ref 26.6–33)
MCHC RBC AUTO-ENTMCNC: 34.2 G/DL (ref 31.5–35.7)
MCV RBC AUTO: 97.3 FL (ref 79–97)
MONOCYTES # BLD AUTO: 0.52 10*3/MM3 (ref 0.1–0.9)
MONOCYTES NFR BLD AUTO: 10 % (ref 5–12)
NEUTROPHILS NFR BLD AUTO: 2.43 10*3/MM3 (ref 1.7–7)
NEUTROPHILS NFR BLD AUTO: 46.6 % (ref 42.7–76)
NRBC BLD AUTO-RTO: 0 /100 WBC (ref 0–0.2)
PLATELET # BLD AUTO: 308 10*3/MM3 (ref 140–450)
PMV BLD AUTO: 9.5 FL (ref 6–12)
POTASSIUM SERPL-SCNC: 4.1 MMOL/L (ref 3.5–5.2)
PROT SERPL-MCNC: 6.8 G/DL (ref 6–8.5)
RBC # BLD AUTO: 4 10*6/MM3 (ref 3.77–5.28)
SODIUM SERPL-SCNC: 136 MMOL/L (ref 136–145)
T4 FREE SERPL-MCNC: 1.3 NG/DL (ref 0.92–1.68)
TRIGL SERPL-MCNC: 111 MG/DL (ref 0–150)
TSH SERPL DL<=0.05 MIU/L-ACNC: 1.9 UIU/ML (ref 0.27–4.2)
VIT B12 BLD-MCNC: 340 PG/ML (ref 211–946)
VLDLC SERPL-MCNC: 20 MG/DL (ref 5–40)
WBC NRBC COR # BLD AUTO: 5.2 10*3/MM3 (ref 3.4–10.8)

## 2024-11-05 PROCEDURE — 85025 COMPLETE CBC W/AUTO DIFF WBC: CPT | Performed by: FAMILY MEDICINE

## 2024-11-05 PROCEDURE — 36415 COLL VENOUS BLD VENIPUNCTURE: CPT | Performed by: FAMILY MEDICINE

## 2024-11-05 PROCEDURE — 82607 VITAMIN B-12: CPT | Performed by: FAMILY MEDICINE

## 2024-11-05 PROCEDURE — 80061 LIPID PANEL: CPT | Performed by: FAMILY MEDICINE

## 2024-11-05 PROCEDURE — 80053 COMPREHEN METABOLIC PANEL: CPT | Performed by: FAMILY MEDICINE

## 2024-11-05 PROCEDURE — 84439 ASSAY OF FREE THYROXINE: CPT | Performed by: FAMILY MEDICINE

## 2024-11-05 PROCEDURE — 82306 VITAMIN D 25 HYDROXY: CPT | Performed by: FAMILY MEDICINE

## 2024-11-05 PROCEDURE — 83036 HEMOGLOBIN GLYCOSYLATED A1C: CPT | Performed by: FAMILY MEDICINE

## 2024-11-05 PROCEDURE — 84443 ASSAY THYROID STIM HORMONE: CPT | Performed by: FAMILY MEDICINE

## 2024-11-08 DIAGNOSIS — M70.62 TROCHANTERIC BURSITIS OF LEFT HIP: ICD-10-CM

## 2024-11-08 RX ORDER — MELOXICAM 7.5 MG/1
7.5 TABLET ORAL DAILY
Qty: 90 TABLET | Refills: 1 | Status: SHIPPED | OUTPATIENT
Start: 2024-11-08

## 2024-11-14 ENCOUNTER — HOSPITAL ENCOUNTER (OUTPATIENT)
Dept: MAMMOGRAPHY | Facility: HOSPITAL | Age: 69
Discharge: HOME | End: 2024-11-14
Admitting: FAMILY MEDICINE
Payer: MEDICARE

## 2024-11-14 DIAGNOSIS — Z12.31 ENCOUNTER FOR SCREENING MAMMOGRAM FOR MALIGNANT NEOPLASM OF BREAST: ICD-10-CM

## 2024-11-14 DIAGNOSIS — Z00.00 MEDICARE ANNUAL WELLNESS VISIT, SUBSEQUENT: ICD-10-CM

## 2024-11-14 PROCEDURE — 77067 SCR MAMMO BI INCL CAD: CPT

## 2024-11-14 PROCEDURE — 77063 BREAST TOMOSYNTHESIS BI: CPT

## 2024-11-19 ENCOUNTER — HOSPITAL ENCOUNTER (OUTPATIENT)
Dept: GENERAL RADIOLOGY | Facility: HOSPITAL | Age: 69
Discharge: HOME | End: 2024-11-19
Admitting: FAMILY MEDICINE
Payer: MEDICARE

## 2024-11-19 DIAGNOSIS — J38.3 VOCAL CORD DYSFUNCTION: ICD-10-CM

## 2024-11-19 PROCEDURE — 74230 X-RAY XM SWLNG FUNCJ C+: CPT

## 2024-11-19 PROCEDURE — 92611 MOTION FLUOROSCOPY/SWALLOW: CPT

## 2024-11-19 RX ADMIN — BARIUM SULFATE 50 ML: 400 SUSPENSION ORAL at 09:59

## 2024-11-19 RX ADMIN — BARIUM SULFATE 1 TEASPOON(S): 0.6 CREAM ORAL at 09:59

## 2024-11-19 NOTE — MBS/VFSS/FEES
Outpatient Speech Language Pathology   Adult Swallow Initial Evaluation  [facility]     Patient Name: Mehnaz Raines  : 1955  MRN: [id number]  Today's Date: 2024         Visit Date: 2024   Patient Active Problem List   Diagnosis    Dysphonia    Family history of diabetes mellitus    Gastroenteritis    Generalized anxiety disorder    Hyperlipidemia    Hypertension    Depression    Acquired night blindness    Vitreous degeneration    Osteoarthritis    Cervical radiculopathy    Cervical dysplasia    Bilateral carpal tunnel syndrome    Trochanteric bursitis of left hip        Past Medical History:   Diagnosis Date    Allergic 1968    PCN    Anemia     Arthritis     Asthma     Cancer     Uterus    Cataract     Colon polyp     Depression     Dysphonia     CHRONIC    JACOBO (generalized anxiety disorder)     Heart murmur     HL (hearing loss)     Hyperlipidemia     Hypertension     Shortness of breath     Tremor 2018    Vocal cord tremor    Urinary tract infection         Past Surgical History:   Procedure Laterality Date    BREAST EXCISIONAL BIOPSY Right 2010    COLONOSCOPY  2017    HYSTERECTOMY      THUMB ARTHROSCOPY Left 10/15/2024         Visit Dx:     ICD-10-CM ICD-9-CM   1. Vocal cord dysfunction  J38.3 478.5                SLP Adult Swallow Evaluation       Row Name 24 1300       Rehab Evaluation    Document Type evaluation  -LF    Patient Observations alert;cooperative  -LF       General Information    Patient Profile Reviewed yes  -LF    Pertinent History Of Current Problem Pt is a 69 y/o female who presents to [facility] for a VFSS to objectively assess swallow function. Pt reports progressive dysphagia w/ both solids and liquids. She endorses sensation of PO sticking and reports chin tuck posture w/ PO reduces s/s of difficulty. She endorses hx of vocal cord dysfunction and takes primidone 50 BID. She denies hx of PNA, reflux, head/neck cancer, or head/neck surgeries.     -LF     Current Method of Nutrition regular textures;thin liquids  -LF    Precautions/Limitations, Vision WFL;for purposes of eval  -LF    Precautions/Limitations, Hearing WFL;for purposes of eval  -LF    Prior Level of Function-Swallowing no diet consistency restrictions  -LF    Plans/Goals Discussed with patient;agreed upon  -LF    Barriers to Rehab none identified  -LF       Oral Motor Structure and Function    Dentition Assessment natural, present and adequate  -LF    Secretion Management WNL/WFL  -LF    Mucosal Quality moist, healthy  -LF       Oral Musculature and Cranial Nerve Assessment    Oral Motor General Assessment WFL  -LF       MBS/VFSS    Utensils Used spoon;cup;straw  -LF       MBS/VFSS Interpretation    VFSS Summary Video Fluoroscopic Swallow Study conducted in the lateral projection with the patient standing/seated upright. Pt self fed all trials as provided by SLP respectively: thin liquid by cup x 2, puree (applesauce) x 2, mechanical soft/mixed consistency (peaches) x 2, regular solid (cracker) x 1, thin by straw sequential drinks x1, & esophageal scan. Suspected cervical spur noted at the C6-C7 level. Oral phase characterized by adequate labial seal w/ no anterior loss. Functional mastication of both mechanical soft and regular solids. Reduced bolus control w/ premature spillage to the pyriforms x1 w/ thin liquid portion of mixed consistency, and to the vallecula w/ puree trials. Laryngeal elevation, epiglottic deflection, and forward hyolaryngeal movement are WFL. There is no laryngeal vestibule penetration or aspiration observed at any time. Trace pharyngeal residue across all consistencies, which pt fully clears w/ a spontaneous dry swallow. Esophageal scan was unremarkable, however, slow clearance in the proximal esophagus noted during puree trials    “When evaluating the esophageal domain, it is important to understand that neither the imaging protocol nor purpose of the MBSS is sufficient for  complete assessment of esophageal function; rather, MBSS focuses on the degree and timing of esophageal bolus clearance (in the upright position) both of which influence oral and pharyngeal function” (HARINI Recinos, ALYSIA Story., ALDAIR Samayoa., MYRON Marino, JEROME Awad, TAYLOR Peterson. (2020). Best practices in Modified Barium Swallow Studies, American Journal of Speech-Language Pathology, 29(2S),6734-3181. https://doi.org/10.1044/8040_WIETK-11-22738).     PENETRATION/ASPIRATION SCALE: (BRE Magana et al. A Penetration-Aspiration Scale. Dysphagia. 1996; 11(2):93-8.)    IMPRESSIONS: Pt presents with oropharyngeal swallow which is judged to be broadly within functional limits under fluoroscopy with all trials assessed.      RECOMMENDATIONS:   - regular and thin liquid diet  - avoid dry, tough, and sticky consistencies  - upright for all PO  - small bites/sips  - Pt may benefit from a GI consult for a dedicated esophageal assessment    Reviewed VFSS results/recs w/ pt immediately following the procedure w/ playback of study via JEANETTE. She expressed understanding. Thank you for referring this pleasant pt.    -LF       Initiation of Pharyngeal Swallow    Pharyngeal Phase functional pharyngeal phase of swallowing -LF    Anatomical abnormalities noted other (see comments)  suspected cervical spur at the  -LF    Anatomical abnormalities functional impact no functional impact on swallowing  -LF    Rosenbek's Scale all consistencies:;1--->level 1  -LF       SLP Evaluation Clinical Impression    SLP Swallowing Diagnosis functional oral phase;functional pharyngeal phase  -LF       Recommendations    SLP Diet Recommendation regular textures;thin liquids  -LF    Recommended Precautions and Strategies upright posture during/after eating;small bites of food and sips of liquid;general aspiration precautions;reflux precautions  -LF    Oral Care Recommendations Oral Care BID/PRN;Oral Care before breakfast, after meals and  PRN;Toothbrush  -LF    SLP Rec. for Method of Medication Administration as tolerated  -LF    Demonstrates Need for Referral to Another Service gastroenterology  -LF              User Key  (r) = Recorded By, (t) = Taken By, (c) = Cosigned By      Initials Name Provider Type    Manju Schultz, SLP Speech and Language Pathologist                                                 Time Calculation:                     NED Avendaño  11/19/2024

## 2025-05-09 DIAGNOSIS — F32.A DEPRESSION, UNSPECIFIED DEPRESSION TYPE: ICD-10-CM

## 2025-05-09 DIAGNOSIS — M70.62 TROCHANTERIC BURSITIS OF LEFT HIP: ICD-10-CM

## 2025-05-09 DIAGNOSIS — R49.0 DYSPHONIA: ICD-10-CM

## 2025-05-09 RX ORDER — MELOXICAM 7.5 MG/1
7.5 TABLET ORAL DAILY
Qty: 90 TABLET | Refills: 1 | Status: SHIPPED | OUTPATIENT
Start: 2025-05-09

## 2025-05-09 RX ORDER — HYDROCHLOROTHIAZIDE 25 MG/1
25 TABLET ORAL DAILY
Qty: 90 TABLET | Refills: 1 | Status: SHIPPED | OUTPATIENT
Start: 2025-05-09

## 2025-05-09 RX ORDER — PRIMIDONE 50 MG/1
50 TABLET ORAL EVERY 12 HOURS SCHEDULED
Qty: 180 TABLET | Refills: 1 | Status: SHIPPED | OUTPATIENT
Start: 2025-05-09

## 2025-05-09 RX ORDER — SERTRALINE HYDROCHLORIDE 25 MG/1
25 TABLET, FILM COATED ORAL DAILY
Qty: 90 TABLET | Refills: 1 | Status: SHIPPED | OUTPATIENT
Start: 2025-05-09

## 2025-06-07 NOTE — TELEPHONE ENCOUNTER
"Caller: Mehnaz Raines    Relationship to patient: Self    Best call back number: [phone]    Where are you experiencing symptoms: NECK PAIN    When have you experienced or been treated for these symptoms before: YES-WAS SEEN ON 4/26/22    STATES SHE WAS GIVEN A STEROID AND MUSCLE RELAXER WHICH HELPED BUT HAS RECENTLY CAME BACK \"WITH VENGEANCE\".     REQUESTING A CALL WITH MEDICAL ADVICE IF SHE NEEDS TO COME BACK IN TO BE SEEN OR A MEDICATION CAN BE CALLED IN AGAIN.    If a prescription is needed, what is your preferred pharmacy:   [facility]/pharmacy #[id number] - [place], [place] - [address] - [phone]  - [phone]   [address]  [place] [address]  Phone: [phone] Fax: [phone]           " N/A

## 2025-07-08 RX ORDER — METHYLPREDNISOLONE 4 MG/1
TABLET ORAL
Qty: 1 EACH | Refills: 0 | Status: SHIPPED | OUTPATIENT
Start: 2025-07-08